# Patient Record
Sex: MALE | Race: WHITE | NOT HISPANIC OR LATINO | Employment: OTHER | URBAN - METROPOLITAN AREA
[De-identification: names, ages, dates, MRNs, and addresses within clinical notes are randomized per-mention and may not be internally consistent; named-entity substitution may affect disease eponyms.]

---

## 2018-12-08 ENCOUNTER — OFFICE VISIT (OUTPATIENT)
Dept: URGENT CARE | Facility: CLINIC | Age: 63
End: 2018-12-08
Payer: COMMERCIAL

## 2018-12-08 VITALS
RESPIRATION RATE: 12 BRPM | HEIGHT: 72 IN | SYSTOLIC BLOOD PRESSURE: 142 MMHG | OXYGEN SATURATION: 96 % | WEIGHT: 208.63 LBS | BODY MASS INDEX: 28.26 KG/M2 | DIASTOLIC BLOOD PRESSURE: 86 MMHG | TEMPERATURE: 98 F | HEART RATE: 80 BPM

## 2018-12-08 DIAGNOSIS — J32.9 SINUSITIS, UNSPECIFIED CHRONICITY, UNSPECIFIED LOCATION: Primary | ICD-10-CM

## 2018-12-08 DIAGNOSIS — H66.002 LEFT ACUTE SUPPURATIVE OTITIS MEDIA: ICD-10-CM

## 2018-12-08 PROCEDURE — 96372 THER/PROPH/DIAG INJ SC/IM: CPT | Mod: S$GLB,,, | Performed by: NURSE PRACTITIONER

## 2018-12-08 PROCEDURE — 3008F BODY MASS INDEX DOCD: CPT | Mod: CPTII,S$GLB,, | Performed by: NURSE PRACTITIONER

## 2018-12-08 PROCEDURE — 99204 OFFICE O/P NEW MOD 45 MIN: CPT | Mod: 25,S$GLB,, | Performed by: NURSE PRACTITIONER

## 2018-12-08 RX ORDER — AZITHROMYCIN 250 MG/1
TABLET, FILM COATED ORAL
Qty: 6 TABLET | Refills: 0 | Status: SHIPPED | OUTPATIENT
Start: 2018-12-08 | End: 2022-05-10 | Stop reason: ALTCHOICE

## 2018-12-08 RX ORDER — DEXAMETHASONE SODIUM PHOSPHATE 4 MG/ML
8 INJECTION, SOLUTION INTRA-ARTICULAR; INTRALESIONAL; INTRAMUSCULAR; INTRAVENOUS; SOFT TISSUE
Status: COMPLETED | OUTPATIENT
Start: 2018-12-08 | End: 2018-12-08

## 2018-12-08 RX ADMIN — DEXAMETHASONE SODIUM PHOSPHATE 8 MG: 4 INJECTION, SOLUTION INTRA-ARTICULAR; INTRALESIONAL; INTRAMUSCULAR; INTRAVENOUS; SOFT TISSUE at 09:12

## 2018-12-08 NOTE — PATIENT INSTRUCTIONS
Middle Ear Infection (Adult)  You have an infection of the middle ear, the space behind the eardrum. This is also called acute otitis media (AOM). Sometimes it is caused by the common cold. This is because congestion can block the internal passage (eustachian tube) that drains fluid from the middle ear. When the middle ear fills with fluid, bacteria can grow there and cause an infection. Oral antibiotics are used to treat this illness, not ear drops. Symptoms usually start to improve within 1 to 2 days of treatment.    Home care  The following are general care guidelines:  · Finish all of the antibiotic medicine given, even though you may feel better after the first few days.  · You may use over-the-counter medicine, such as acetaminophen or ibuprofen, to control pain and fever, unless something else was prescribed. If you have chronic liver or kidney disease or have ever had a stomach ulcer or gastrointestinal bleeding, talk with your healthcare provider before using these medicines. Do not give aspirin to anyone under 18 years of age who has a fever. It may cause severe illness or death.  Follow-up care  Follow up with your healthcare provider, or as advised, in 2 weeks if all symptoms have not gotten better, or if hearing doesn't go back to normal within 1 month.  When to seek medical advice  Call your healthcare provider right away if any of these occur:  · Ear pain gets worse or does not improve after 3 days of treatment  · Unusual drowsiness or confusion  · Neck pain, stiff neck, or headache  · Fluid or blood draining from the ear canal  · Fever of 100.4°F (38°C) or as advised   · Seizure  Date Last Reviewed: 6/1/2016  © 8886-5631 [employer]. [address] [place] [address], [place] [address]. All rights reserved. This information is not intended as a substitute for professional medical care. Always follow your healthcare professional's instructions.        Sinusitis (Antibiotic Treatment)    The  sinuses are air-filled spaces within the bones of the face. They connect to the inside of the nose. Sinusitis is an inflammation of the tissue lining the sinus cavity. Sinus inflammation can occur during a cold. It can also be due to allergies to pollens and other particles in the air. Sinusitis can cause symptoms of sinus congestion and fullness. A sinus infection causes fever, headache and facial pain. There is often green or yellow drainage from the nose or into the back of the throat (post-nasal drip). You have been given antibiotics to treat this condition.  Home care:  · Take the full course of antibiotics as instructed. Do not stop taking them, even if you feel better.  · Drink plenty of water, hot tea, and other liquids. This may help thin mucus. It also may promote sinus drainage.  · Heat may help soothe painful areas of the face. Use a towel soaked in hot water. Or,  the shower and direct the hot spray onto your face. Using a vaporizer along with a menthol rub at night may also help.   · An expectorant containing guaifenesin may help thin the mucus and promote drainage from the sinuses.  · Over-the-counter decongestants may be used unless a similar medicine was prescribed. Nasal sprays work the fastest. Use one that contains phenylephrine or oxymetazoline. First blow the nose gently. Then use the spray. Do not use these medicines more often than directed on the label or symptoms may get worse. You may also use tablets containing pseudoephedrine. Avoid products that combine ingredients, because side effects may be increased. Read labels. You can also ask the pharmacist for help. (NOTE: Persons with high blood pressure should not use decongestants. They can raise blood pressure.)  · Over-the-counter antihistamines may help if allergies contributed to your sinusitis.    · Do not use nasal rinses or irrigation during an acute sinus infection, unless told to by your health care provider. Rinsing may  spread the infection to other sinuses.  · Use acetaminophen or ibuprofen to control pain, unless another pain medicine was prescribed. (If you have chronic liver or kidney disease or ever had a stomach ulcer, talk with your doctor before using these medicines. Aspirin should never be used in anyone under 18 years of age who is ill with a fever. It may cause severe liver damage.)  · Don't smoke. This can worsen symptoms.  Follow-up care  Follow up with your healthcare provider or our staff if you are not improving within the next week.  When to seek medical advice  Call your healthcare provider if any of these occur:  · Facial pain or headache becoming more severe  · Stiff neck  · Unusual drowsiness or confusion  · Swelling of the forehead or eyelids  · Vision problems, including blurred or double vision  · Fever of 100.4ºF (38ºC) or higher, or as directed by your healthcare provider  · Seizure  · Breathing problems  · Symptoms not resolving within 10 days  Date Last Reviewed: 4/13/2015  © 4811-4741 [employer]. [address] [place] [address], [place] [address]. All rights reserved. This information is not intended as a substitute for professional medical care. Always follow your healthcare professional's instructions.

## 2018-12-08 NOTE — PROGRESS NOTES
Subjective:       Patient ID: Prosper Hua is a 63 y.o. male.    Vitals:  height is 6' (1.829 m) and weight is 94.6 kg (208 lb 9.6 oz). His oral temperature is 98.2 °F (36.8 °C). His blood pressure is 142/86 (abnormal) and his pulse is 80. His respiration is 12 and oxygen saturation is 96%.     Chief Complaint: Sinusitis    Pt presents with nasal congestion and left ear pain x 1 week. Pt denies f/c/n/v.       Sinusitis   This is a new problem. The current episode started 1 to 4 weeks ago. The problem is unchanged. His pain is at a severity of 6/10. Associated symptoms include congestion, ear pain, headaches and sinus pressure. Pertinent negatives include no chills, coughing, diaphoresis, shortness of breath or sore throat. Treatments tried: sudafed. The treatment provided mild relief.       Constitution: Negative for chills, sweating, fatigue and fever.   HENT: Positive for ear pain, congestion and sinus pressure. Negative for sinus pain, sore throat and voice change.    Neck: Negative for painful lymph nodes.   Eyes: Negative for eye redness.   Respiratory: Negative for chest tightness, cough, sputum production, bloody sputum, COPD, shortness of breath, stridor, wheezing and asthma.    Gastrointestinal: Negative for nausea and vomiting.   Musculoskeletal: Negative for muscle ache.   Skin: Negative for rash.   Allergic/Immunologic: Negative for seasonal allergies and asthma.   Neurological: Positive for headaches.   Hematologic/Lymphatic: Negative for swollen lymph nodes.       Objective:      Physical Exam   Constitutional: He is oriented to person, place, and time. He appears well-developed and well-nourished. He is cooperative.  Non-toxic appearance. He does not appear ill. No distress.   HENT:   Head: Normocephalic and atraumatic.   Right Ear: Hearing, tympanic membrane, external ear and ear canal normal.   Left Ear: Hearing, external ear and ear canal normal.   Nose: Mucosal edema present. No rhinorrhea or  nasal deformity. No epistaxis. Right sinus exhibits maxillary sinus tenderness. Right sinus exhibits no frontal sinus tenderness. Left sinus exhibits maxillary sinus tenderness. Left sinus exhibits no frontal sinus tenderness.   Mouth/Throat: Uvula is midline, oropharynx is clear and moist and mucous membranes are normal. No trismus in the jaw. Normal dentition. No uvula swelling. No posterior oropharyngeal erythema.   Left ear with purulent effusion   Eyes: Conjunctivae and lids are normal. No scleral icterus.   Sclera clear bilat   Neck: Trachea normal, full passive range of motion without pain and phonation normal. Neck supple.   Cardiovascular: Normal rate, regular rhythm, normal heart sounds, intact distal pulses and normal pulses.   Pulmonary/Chest: Effort normal and breath sounds normal. No respiratory distress.   Abdominal: Soft. Normal appearance and bowel sounds are normal. He exhibits no distension. There is no tenderness.   Musculoskeletal: Normal range of motion. He exhibits no edema or deformity.   Neurological: He is alert and oriented to person, place, and time. He exhibits normal muscle tone. Coordination normal.   Skin: Skin is warm, dry and intact. He is not diaphoretic. No pallor.   Psychiatric: He has a normal mood and affect. His speech is normal and behavior is normal. Judgment and thought content normal. Cognition and memory are normal.   Nursing note and vitals reviewed.      Assessment:       1. Sinusitis, unspecified chronicity, unspecified location    2. Left acute suppurative otitis media        Plan:         Sinusitis, unspecified chronicity, unspecified location    Left acute suppurative otitis media    Other orders  -     dexamethasone injection 8 mg  -     azithromycin (Z-REJI) 250 MG tablet; Take 2 tablets by mouth on day 1; Take 1 tablet by mouth on days 2-5  Dispense: 6 tablet; Refill: 0

## 2018-12-12 RX ORDER — CEFDINIR 300 MG/1
300 CAPSULE ORAL 2 TIMES DAILY
Qty: 20 CAPSULE | Refills: 0 | Status: SHIPPED | OUTPATIENT
Start: 2018-12-12 | End: 2018-12-22

## 2018-12-12 NOTE — PROGRESS NOTES
Pt called stating ear pain still present and only slightly improved. Pt denies f/c/n/v. Pt advised to stop zithromax and I will send in new rx for omnicef 300mg po bid x 10 days. Pt advised to f/u with PCP in the next several days for recheck or RTC if ear pain not improving. Pt verbalized understanding.

## 2021-03-06 ENCOUNTER — IMMUNIZATION (OUTPATIENT)
Dept: PRIMARY CARE CLINIC | Facility: CLINIC | Age: 66
End: 2021-03-06
Payer: COMMERCIAL

## 2021-03-06 DIAGNOSIS — Z23 NEED FOR VACCINATION: Primary | ICD-10-CM

## 2021-03-06 PROCEDURE — 91303 COVID-19,VECTOR-NR,RS-AD26,PF,0.5 ML DOSE VACCINE (JANSSEN): CPT | Mod: S$GLB,,, | Performed by: FAMILY MEDICINE

## 2021-03-06 PROCEDURE — 91303 COVID-19,VECTOR-NR,RS-AD26,PF,0.5 ML DOSE VACCINE (JANSSEN): ICD-10-PCS | Mod: S$GLB,,, | Performed by: FAMILY MEDICINE

## 2021-03-06 PROCEDURE — 0031A COVID-19,VECTOR-NR,RS-AD26,PF,0.5 ML DOSE VACCINE (JANSSEN): CPT | Mod: CV19,S$GLB,, | Performed by: FAMILY MEDICINE

## 2021-03-06 PROCEDURE — 0031A COVID-19,VECTOR-NR,RS-AD26,PF,0.5 ML DOSE VACCINE (JANSSEN): ICD-10-PCS | Mod: CV19,S$GLB,, | Performed by: FAMILY MEDICINE

## 2022-05-10 ENCOUNTER — OFFICE VISIT (OUTPATIENT)
Dept: DERMATOLOGY | Facility: CLINIC | Age: 67
End: 2022-05-10
Payer: COMMERCIAL

## 2022-05-10 VITALS — HEIGHT: 72 IN | WEIGHT: 208 LBS | BODY MASS INDEX: 28.17 KG/M2

## 2022-05-10 DIAGNOSIS — L82.1 SEBORRHEIC KERATOSES: ICD-10-CM

## 2022-05-10 DIAGNOSIS — D22.9 MULTIPLE BENIGN NEVI: ICD-10-CM

## 2022-05-10 DIAGNOSIS — L81.4 SOLAR LENTIGO: ICD-10-CM

## 2022-05-10 DIAGNOSIS — L57.0 ACTINIC KERATOSES: Primary | ICD-10-CM

## 2022-05-10 PROCEDURE — 99203 OFFICE O/P NEW LOW 30 MIN: CPT | Mod: 25,S$GLB,, | Performed by: DERMATOLOGY

## 2022-05-10 PROCEDURE — 3008F BODY MASS INDEX DOCD: CPT | Mod: CPTII,S$GLB,, | Performed by: DERMATOLOGY

## 2022-05-10 PROCEDURE — 4010F PR ACE/ARB THEARPY RXD/TAKEN: ICD-10-PCS | Mod: CPTII,S$GLB,, | Performed by: DERMATOLOGY

## 2022-05-10 PROCEDURE — 17000 DESTRUCT PREMALG LESION: CPT | Mod: S$GLB,,, | Performed by: DERMATOLOGY

## 2022-05-10 PROCEDURE — 99999 PR PBB SHADOW E&M-EST. PATIENT-LVL III: ICD-10-PCS | Mod: PBBFAC,,, | Performed by: DERMATOLOGY

## 2022-05-10 PROCEDURE — 1159F PR MEDICATION LIST DOCUMENTED IN MEDICAL RECORD: ICD-10-PCS | Mod: CPTII,S$GLB,, | Performed by: DERMATOLOGY

## 2022-05-10 PROCEDURE — 1160F PR REVIEW ALL MEDS BY PRESCRIBER/CLIN PHARMACIST DOCUMENTED: ICD-10-PCS | Mod: CPTII,S$GLB,, | Performed by: DERMATOLOGY

## 2022-05-10 PROCEDURE — 17000 PR DESTRUCTION(LASER SURGERY,CRYOSURGERY,CHEMOSURGERY),PREMALIGNANT LESIONS,FIRST LESION: ICD-10-PCS | Mod: S$GLB,,, | Performed by: DERMATOLOGY

## 2022-05-10 PROCEDURE — 3008F PR BODY MASS INDEX (BMI) DOCUMENTED: ICD-10-PCS | Mod: CPTII,S$GLB,, | Performed by: DERMATOLOGY

## 2022-05-10 PROCEDURE — 1159F MED LIST DOCD IN RCRD: CPT | Mod: CPTII,S$GLB,, | Performed by: DERMATOLOGY

## 2022-05-10 PROCEDURE — 1160F RVW MEDS BY RX/DR IN RCRD: CPT | Mod: CPTII,S$GLB,, | Performed by: DERMATOLOGY

## 2022-05-10 PROCEDURE — 4010F ACE/ARB THERAPY RXD/TAKEN: CPT | Mod: CPTII,S$GLB,, | Performed by: DERMATOLOGY

## 2022-05-10 PROCEDURE — 99203 PR OFFICE/OUTPT VISIT, NEW, LEVL III, 30-44 MIN: ICD-10-PCS | Mod: 25,S$GLB,, | Performed by: DERMATOLOGY

## 2022-05-10 PROCEDURE — 99999 PR PBB SHADOW E&M-EST. PATIENT-LVL III: CPT | Mod: PBBFAC,,, | Performed by: DERMATOLOGY

## 2022-05-10 RX ORDER — NAPROXEN SODIUM 220 MG/1
81 TABLET, FILM COATED ORAL EVERY OTHER DAY
COMMUNITY
End: 2024-01-30

## 2022-05-10 NOTE — PROGRESS NOTES
Subjective:       Patient ID:  Prosper Hua is a 66 y.o. male who presents for   Chief Complaint   Patient presents with    Spot     On left side of nose     LOV 3/3/17-Dr. Amanda guaman    Here today to have a spot on the left side of his nose looked at   Been there for years with no symptoms    Has no hx of NMSC  Has no fhx of MM      Current Outpatient Medications:     aspirin 325 MG tablet, Take 325 mg by mouth every other day., Disp: , Rfl:     atorvastatin (LIPITOR) 40 MG tablet, Take 40 mg by mouth once daily., Disp: , Rfl: 3    clopidogreL (PLAVIX) 75 mg tablet, Take 75 mg by mouth once daily., Disp: , Rfl:     lisinopril 10 MG tablet, Take 10 mg by mouth once daily., Disp: , Rfl:     azithromycin (Z-REJI) 250 MG tablet, Take 2 tablets by mouth on day 1; Take 1 tablet by mouth on days 2-5 (Patient not taking: Reported on 5/10/2022), Disp: 6 tablet, Rfl: 0    metoprolol succinate (TOPROL-XL) 100 MG 24 hr tablet, Take 100 mg by mouth once daily., Disp: , Rfl: 3    ranitidine (ZANTAC) 300 MG tablet, Take 1 tablet (300 mg total) by mouth 2 (two) times daily., Disp: 60 tablet, Rfl: 11        Review of Systems   Constitutional: Negative for fever, chills and fatigue.   Respiratory: Negative for cough and shortness of breath.    Skin: Positive for activity-related sunscreen use. Negative for itching, rash, dry skin, daily sunscreen use and wears hat.   Hematologic/Lymphatic: Bruises/bleeds easily.        Objective:    Physical Exam   Constitutional: He appears well-developed and well-nourished. No distress.   Neurological: He is alert and oriented to person, place, and time. He is not disoriented.   Psychiatric: He has a normal mood and affect.   Skin:   Areas Examined (abnormalities noted in diagram):   Head / Face Inspection Performed  Neck Inspection Performed  Chest / Axilla Inspection Performed  Back Inspection Performed  RUE Inspected  LUE Inspection Performed                       Diagram Legend      Erythematous scaling macule/papule c/w actinic keratosis       Vascular papule c/w angioma      Pigmented verrucoid papule/plaque c/w seborrheic keratosis      Yellow umbilicated papule c/w sebaceous hyperplasia      Irregularly shaped tan macule c/w lentigo     1-2 mm smooth white papules consistent with Milia      Movable subcutaneous cyst with punctum c/w epidermal inclusion cyst      Subcutaneous movable cyst c/w pilar cyst      Firm pink to brown papule c/w dermatofibroma      Pedunculated fleshy papule(s) c/w skin tag(s)      Evenly pigmented macule c/w junctional nevus     Mildly variegated pigmented, slightly irregular-bordered macule c/w mildly atypical nevus      Flesh colored to evenly pigmented papule c/w intradermal nevus       Pink pearly papule/plaque c/w basal cell carcinoma      Erythematous hyperkeratotic cursted plaque c/w SCC      Surgical scar with no sign of skin cancer recurrence      Open and closed comedones      Inflammatory papules and pustules      Verrucoid papule consistent consistent with wart     Erythematous eczematous patches and plaques     Dystrophic onycholytic nail with subungual debris c/w onychomycosis     Umbilicated papule    Erythematous-base heme-crusted tan verrucoid plaque consistent with inflamed seborrheic keratosis     Erythematous Silvery Scaling Plaque c/w Psoriasis     See annotation      Assessment / Plan:        Actinic keratoses  Cryosurgery Procedure Note    Verbal consent from the patient is obtained and the patient is aware of the precancerous quality and need for treatment of these lesions. Liquid nitrogen cryosurgery is applied to the 1 actinic keratoses, as detailed in the physical exam, to produce a freeze injury. The patient is aware that blisters may form and is instructed on wound care with gentle cleansing and use of vaseline ointment to keep moist until healed. The patient is supplied a handout on cryosurgery and is instructed to call if lesions do  not completely resolve.    Seborrheic keratoses, including large lesions on left nasofacial sulcus and R back  These are benign inherited growths without a malignant potential. Reassurance given to patient. No treatment is necessary.     Solar lentigo  This is a benign hyperpigmented sun induced lesion. Daily sun protection will reduce the number of new lesions. Treatment of these benign lesions are considered cosmetic.    Multiple benign nevi  Careful dermoscopy evaluation of nevi performed with none identified as needing biopsy today  Monitor for new mole or moles that are becoming bigger, darker, irritated, or developing irregular borders.     Patient instructed in importance in daily broad spectrum sun protection of at least spf 30. Mineral sunscreen ingredients preferred (Zinc +/- Titanium) and can be found OTC.   Recommend Elta MD for daily use on face and neck.  Patient encouraged to wear hat for all outdoor exposure.   Also discussed sun avoidance and use of protective clothing.               Follow up in about 1 year (around 5/10/2023), or if symptoms worsen or fail to improve.

## 2022-05-10 NOTE — PATIENT INSTRUCTIONS

## 2024-01-30 ENCOUNTER — OFFICE VISIT (OUTPATIENT)
Dept: FAMILY MEDICINE | Facility: CLINIC | Age: 69
End: 2024-01-30
Payer: COMMERCIAL

## 2024-01-30 DIAGNOSIS — L25.9 CONTACT DERMATITIS, UNSPECIFIED CONTACT DERMATITIS TYPE, UNSPECIFIED TRIGGER: Primary | ICD-10-CM

## 2024-01-30 PROCEDURE — 99203 OFFICE O/P NEW LOW 30 MIN: CPT | Mod: 95,,, | Performed by: PHYSICIAN ASSISTANT

## 2024-01-30 PROCEDURE — 1159F MED LIST DOCD IN RCRD: CPT | Mod: CPTII,95,, | Performed by: PHYSICIAN ASSISTANT

## 2024-01-30 PROCEDURE — 1160F RVW MEDS BY RX/DR IN RCRD: CPT | Mod: CPTII,95,, | Performed by: PHYSICIAN ASSISTANT

## 2024-01-30 RX ORDER — ALPRAZOLAM 0.5 MG/1
0.5 TABLET ORAL NIGHTLY PRN
COMMUNITY
Start: 2023-12-26

## 2024-01-30 RX ORDER — PREDNISONE 10 MG/1
TABLET ORAL
Qty: 12 TABLET | Refills: 0 | Status: SHIPPED | OUTPATIENT
Start: 2024-01-30

## 2024-01-30 RX ORDER — ATORVASTATIN CALCIUM 80 MG/1
80 TABLET, FILM COATED ORAL NIGHTLY
COMMUNITY
Start: 2023-12-26

## 2024-01-30 RX ORDER — HYDROCORTISONE 25 MG/G
CREAM TOPICAL 2 TIMES DAILY
Qty: 28 G | Refills: 0 | Status: SHIPPED | OUTPATIENT
Start: 2024-01-30

## 2024-01-30 NOTE — PROGRESS NOTES
The patient location is: [place]  The chief complaint leading to consultation is: rash    Visit type: audiovisual    Face to Face time with patient: 15 minutes of total time spent on the encounter, which includes face to face time and non-face to face time preparing to see the patient (eg, review of tests), Obtaining and/or reviewing separately obtained history, Documenting clinical information in the electronic or other health record, Independently interpreting results (not separately reported) and communicating results to the patient/family/caregiver, or Care coordination (not separately reported).         Each patient to whom he or she provides medical services by telemedicine is:  (1) informed of the relationship between the physician and patient and the respective role of any other health care provider with respect to management of the patient; and (2) notified that he or she may decline to receive medical services by telemedicine and may withdraw from such care at any time.    Notes:  Patient states the symptoms started about 2 weeks ago.  He complains of a red raised bumpy itchy rash on the forehead and on his cheeks.  He has been using Neosporin with no relief.  He denies any new medications and denies any new soap, lotion, detergent or shampoo.  He denies any sign of infection or pain in the skin.  He denies any throat swelling or eye drainage.    Prosper was seen today for rash.    Diagnoses and all orders for this visit:    Contact dermatitis, unspecified contact dermatitis type, unspecified trigger  -     predniSONE (DELTASONE) 10 MG tablet; 3 pills a day for 2 days, then 2 pills a day for 2 days then 1 pill a day  -     hydrocortisone 2.5 % cream; Apply topically 2 (two) times daily.     If symptoms do not improve he needs to be seen in clinic.  He also needs to schedule his routine medical checkup and states he will call back to do so  Answers submitted by the patient for this visit:  Rash  Questionnaire (Submitted on 1/29/2024)  Chief Complaint: Rash  Chronicity: new  Onset: in the past 7 days  Progression since onset: gradually worsening  Affected locations: head, face, left ear, right ear  Characteristics: blistering, burning, redness, itchiness  Exposed to: unknown  anorexia: No  congestion: Yes  cough: Yes  diarrhea: Yes  eye pain: No  facial edema: Yes  fatigue: No  fever: No  joint pain: No  nail changes: No  rhinorrhea: No  shortness of breath: No  sore throat: No  vomiting: No  Treatments tried: anti-itch cream, cold compress, moisturizer  Improvement on treatment: no relief  asthma: No  allergies: No  eczema: No  varicella: Yes

## 2025-01-26 ENCOUNTER — HOSPITAL ENCOUNTER (OUTPATIENT)
Facility: HOSPITAL | Age: 70
Discharge: HOME | End: 2025-01-27
Attending: EMERGENCY MEDICINE | Admitting: INTERNAL MEDICINE
Payer: COMMERCIAL

## 2025-01-26 ENCOUNTER — OFFICE VISIT (OUTPATIENT)
Dept: URGENT CARE | Facility: CLINIC | Age: 70
End: 2025-01-26
Payer: COMMERCIAL

## 2025-01-26 VITALS
TEMPERATURE: 98 F | SYSTOLIC BLOOD PRESSURE: 152 MMHG | DIASTOLIC BLOOD PRESSURE: 87 MMHG | RESPIRATION RATE: 19 BRPM | WEIGHT: 170 LBS | OXYGEN SATURATION: 91 % | HEART RATE: 78 BPM | BODY MASS INDEX: 23.03 KG/M2 | HEIGHT: 72 IN

## 2025-01-26 DIAGNOSIS — R09.02 HYPOXEMIA: ICD-10-CM

## 2025-01-26 DIAGNOSIS — J44.1 COPD EXACERBATION: ICD-10-CM

## 2025-01-26 DIAGNOSIS — J18.9 PNEUMONIA OF LEFT UPPER LOBE DUE TO INFECTIOUS ORGANISM: Primary | ICD-10-CM

## 2025-01-26 DIAGNOSIS — R06.02 SHORTNESS OF BREATH: ICD-10-CM

## 2025-01-26 DIAGNOSIS — R05.9 COUGH, UNSPECIFIED TYPE: ICD-10-CM

## 2025-01-26 DIAGNOSIS — J96.01 ACUTE HYPOXEMIC RESPIRATORY FAILURE: ICD-10-CM

## 2025-01-26 DIAGNOSIS — F17.200 SMOKER: ICD-10-CM

## 2025-01-26 DIAGNOSIS — J18.9 PNEUMONIA OF LEFT LOWER LOBE DUE TO INFECTIOUS ORGANISM: Primary | ICD-10-CM

## 2025-01-26 DIAGNOSIS — J11.1 INFLUENZA: ICD-10-CM

## 2025-01-26 PROBLEM — E87.6 HYPOKALEMIA: Status: ACTIVE | Noted: 2025-01-26

## 2025-01-26 PROBLEM — E87.5 HYPERKALEMIA: Status: ACTIVE | Noted: 2025-01-26

## 2025-01-26 LAB
ALBUMIN SERPL BCP-MCNC: 3 G/DL (ref 3.5–5.2)
ALLENS TEST: ABNORMAL
ALP SERPL-CCNC: 103 U/L (ref 40–150)
ALT SERPL W/O P-5'-P-CCNC: 36 U/L (ref 10–44)
ANION GAP SERPL CALC-SCNC: 14 MMOL/L (ref 8–16)
AST SERPL-CCNC: 32 U/L (ref 10–40)
BASOPHILS # BLD AUTO: ABNORMAL K/UL (ref 0–0.2)
BASOPHILS NFR BLD: 0 % (ref 0–1.9)
BILIRUB SERPL-MCNC: 0.8 MG/DL (ref 0.1–1)
BNP SERPL-MCNC: 37 PG/ML (ref 0–99)
BUN SERPL-MCNC: 10 MG/DL (ref 8–23)
CALCIUM SERPL-MCNC: 8.7 MG/DL (ref 8.7–10.5)
CHLORIDE SERPL-SCNC: 95 MMOL/L (ref 95–110)
CHOLEST SERPL-MCNC: 103 MG/DL (ref 120–199)
CHOLEST/HDLC SERPL: 4.3 {RATIO} (ref 2–5)
CO2 SERPL-SCNC: 23 MMOL/L (ref 23–29)
CREAT SERPL-MCNC: 0.7 MG/DL (ref 0.5–1.4)
CTP QC/QA: YES
CTP QC/QA: YES
DELSYS: ABNORMAL
DIFFERENTIAL METHOD BLD: ABNORMAL
EOSINOPHIL # BLD AUTO: ABNORMAL K/UL (ref 0–0.5)
EOSINOPHIL NFR BLD: 0 % (ref 0–8)
ERYTHROCYTE [DISTWIDTH] IN BLOOD BY AUTOMATED COUNT: 12.3 % (ref 11.5–14.5)
EST. GFR  (NO RACE VARIABLE): >60 ML/MIN/1.73 M^2
ESTIMATED AVG GLUCOSE: 117 MG/DL (ref 68–131)
FLOW: 2
FLUAV AG NPH QL: NEGATIVE
FLUBV AG NPH QL: NEGATIVE
GLUCOSE SERPL-MCNC: 111 MG/DL (ref 70–110)
HBA1C MFR BLD: 5.7 % (ref 4.5–6.2)
HCO3 UR-SCNC: 25.9 MMOL/L (ref 24–28)
HCT VFR BLD AUTO: 44.9 % (ref 40–54)
HDLC SERPL-MCNC: 24 MG/DL (ref 40–75)
HDLC SERPL: 23.3 % (ref 20–50)
HGB BLD-MCNC: 15.7 G/DL (ref 14–18)
IMM GRANULOCYTES # BLD AUTO: ABNORMAL K/UL (ref 0–0.04)
IMM GRANULOCYTES NFR BLD AUTO: ABNORMAL % (ref 0–0.5)
LACTATE SERPL-SCNC: 1.6 MMOL/L (ref 0.5–2.2)
LDLC SERPL CALC-MCNC: 53.8 MG/DL (ref 63–159)
LYMPHOCYTES # BLD AUTO: ABNORMAL K/UL (ref 1–4.8)
LYMPHOCYTES NFR BLD: 10 % (ref 18–48)
MAGNESIUM SERPL-MCNC: 1.8 MG/DL (ref 1.6–2.6)
MCH RBC QN AUTO: 32 PG (ref 27–31)
MCHC RBC AUTO-ENTMCNC: 35 G/DL (ref 32–36)
MCV RBC AUTO: 91 FL (ref 82–98)
MODE: ABNORMAL
MONOCYTES # BLD AUTO: ABNORMAL K/UL (ref 0.3–1)
MONOCYTES NFR BLD: 8 % (ref 4–15)
NEUTROPHILS NFR BLD: 80 % (ref 38–73)
NEUTS BAND NFR BLD MANUAL: 2 %
NONHDLC SERPL-MCNC: 79 MG/DL
NRBC BLD-RTO: 0 /100 WBC
OVALOCYTES BLD QL SMEAR: ABNORMAL
PCO2 BLDA: 35.3 MMHG (ref 35–45)
PH SMN: 7.47 [PH] (ref 7.35–7.45)
PLATELET # BLD AUTO: 223 K/UL (ref 150–450)
PLATELET BLD QL SMEAR: ABNORMAL
PMV BLD AUTO: 9.4 FL (ref 9.2–12.9)
PO2 BLDA: 64 MMHG (ref 80–100)
POC BE: 2 MMOL/L
POC SATURATED O2: 94 % (ref 95–100)
POC TCO2: 27 MMOL/L (ref 23–27)
POIKILOCYTOSIS BLD QL SMEAR: SLIGHT
POTASSIUM SERPL-SCNC: 3 MMOL/L (ref 3.5–5.1)
PROCALCITONIN SERPL IA-MCNC: 0.06 NG/ML (ref 0–0.5)
PROT SERPL-MCNC: 7.5 G/DL (ref 6–8.4)
RBC # BLD AUTO: 4.91 M/UL (ref 4.6–6.2)
SAMPLE: ABNORMAL
SARS-COV-2 AG RESP QL IA.RAPID: NEGATIVE
SITE: ABNORMAL
SODIUM SERPL-SCNC: 132 MMOL/L (ref 136–145)
SP02: 93
T4 FREE SERPL-MCNC: 1.3 NG/DL (ref 0.71–1.51)
TRIGL SERPL-MCNC: 126 MG/DL (ref 30–150)
TROPONIN I SERPL DL<=0.01 NG/ML-MCNC: <0.006 NG/ML (ref 0–0.03)
TSH SERPL DL<=0.005 MIU/L-ACNC: 0.33 UIU/ML (ref 0.4–4)
WBC # BLD AUTO: 12.9 K/UL (ref 3.9–12.7)

## 2025-01-26 PROCEDURE — 99406 BEHAV CHNG SMOKING 3-10 MIN: CPT

## 2025-01-26 PROCEDURE — 83036 HEMOGLOBIN GLYCOSYLATED A1C: CPT | Performed by: NURSE PRACTITIONER

## 2025-01-26 PROCEDURE — 99900031 HC PATIENT EDUCATION (STAT)

## 2025-01-26 PROCEDURE — 36415 COLL VENOUS BLD VENIPUNCTURE: CPT | Performed by: NURSE PRACTITIONER

## 2025-01-26 PROCEDURE — 27000221 HC OXYGEN, UP TO 24 HOURS

## 2025-01-26 PROCEDURE — 80061 LIPID PANEL: CPT | Performed by: NURSE PRACTITIONER

## 2025-01-26 PROCEDURE — 99285 EMERGENCY DEPT VISIT HI MDM: CPT | Mod: 25

## 2025-01-26 PROCEDURE — 99204 OFFICE O/P NEW MOD 45 MIN: CPT | Mod: S$GLB,,,

## 2025-01-26 PROCEDURE — 94640 AIRWAY INHALATION TREATMENT: CPT | Mod: XB

## 2025-01-26 PROCEDURE — 94761 N-INVAS EAR/PLS OXIMETRY MLT: CPT

## 2025-01-26 PROCEDURE — 84443 ASSAY THYROID STIM HORMONE: CPT | Performed by: NURSE PRACTITIONER

## 2025-01-26 PROCEDURE — 84439 ASSAY OF FREE THYROXINE: CPT | Performed by: NURSE PRACTITIONER

## 2025-01-26 PROCEDURE — G0378 HOSPITAL OBSERVATION PER HR: HCPCS

## 2025-01-26 PROCEDURE — 99900035 HC TECH TIME PER 15 MIN (STAT)

## 2025-01-26 PROCEDURE — 84484 ASSAY OF TROPONIN QUANT: CPT | Performed by: EMERGENCY MEDICINE

## 2025-01-26 PROCEDURE — 94799 UNLISTED PULMONARY SVC/PX: CPT

## 2025-01-26 PROCEDURE — 96374 THER/PROPH/DIAG INJ IV PUSH: CPT

## 2025-01-26 PROCEDURE — 83735 ASSAY OF MAGNESIUM: CPT | Performed by: NURSE PRACTITIONER

## 2025-01-26 PROCEDURE — 82803 BLOOD GASES ANY COMBINATION: CPT

## 2025-01-26 PROCEDURE — 25000242 PHARM REV CODE 250 ALT 637 W/ HCPCS: Performed by: NURSE PRACTITIONER

## 2025-01-26 PROCEDURE — 80053 COMPREHEN METABOLIC PANEL: CPT | Performed by: EMERGENCY MEDICINE

## 2025-01-26 PROCEDURE — 96375 TX/PRO/DX INJ NEW DRUG ADDON: CPT

## 2025-01-26 PROCEDURE — 36600 WITHDRAWAL OF ARTERIAL BLOOD: CPT

## 2025-01-26 PROCEDURE — 71046 X-RAY EXAM CHEST 2 VIEWS: CPT | Mod: S$GLB,,, | Performed by: RADIOLOGY

## 2025-01-26 PROCEDURE — 25000003 PHARM REV CODE 250: Performed by: EMERGENCY MEDICINE

## 2025-01-26 PROCEDURE — 94640 AIRWAY INHALATION TREATMENT: CPT

## 2025-01-26 PROCEDURE — 87040 BLOOD CULTURE FOR BACTERIA: CPT | Performed by: EMERGENCY MEDICINE

## 2025-01-26 PROCEDURE — 85027 COMPLETE CBC AUTOMATED: CPT | Performed by: EMERGENCY MEDICINE

## 2025-01-26 PROCEDURE — 63600175 PHARM REV CODE 636 W HCPCS: Mod: JZ,TB | Performed by: NURSE PRACTITIONER

## 2025-01-26 PROCEDURE — 87804 INFLUENZA ASSAY W/OPTIC: CPT | Mod: QW,,,

## 2025-01-26 PROCEDURE — 94760 N-INVAS EAR/PLS OXIMETRY 1: CPT | Mod: XB

## 2025-01-26 PROCEDURE — 84145 PROCALCITONIN (PCT): CPT | Performed by: EMERGENCY MEDICINE

## 2025-01-26 PROCEDURE — 83605 ASSAY OF LACTIC ACID: CPT | Performed by: EMERGENCY MEDICINE

## 2025-01-26 PROCEDURE — 63700000 PHARM REV CODE 250 ALT 637 W/O HCPCS: Performed by: EMERGENCY MEDICINE

## 2025-01-26 PROCEDURE — 25000003 PHARM REV CODE 250: Performed by: NURSE PRACTITIONER

## 2025-01-26 PROCEDURE — 63600175 PHARM REV CODE 636 W HCPCS: Performed by: EMERGENCY MEDICINE

## 2025-01-26 PROCEDURE — 87811 SARS-COV-2 COVID19 W/OPTIC: CPT | Mod: QW,S$GLB,,

## 2025-01-26 PROCEDURE — 25000242 PHARM REV CODE 250 ALT 637 W/ HCPCS: Performed by: EMERGENCY MEDICINE

## 2025-01-26 PROCEDURE — 83880 ASSAY OF NATRIURETIC PEPTIDE: CPT | Performed by: EMERGENCY MEDICINE

## 2025-01-26 PROCEDURE — 36415 COLL VENOUS BLD VENIPUNCTURE: CPT | Performed by: EMERGENCY MEDICINE

## 2025-01-26 PROCEDURE — 85007 BL SMEAR W/DIFF WBC COUNT: CPT | Performed by: EMERGENCY MEDICINE

## 2025-01-26 RX ORDER — TALC
6 POWDER (GRAM) TOPICAL NIGHTLY PRN
Status: DISCONTINUED | OUTPATIENT
Start: 2025-01-26 | End: 2025-01-27 | Stop reason: HOSPADM

## 2025-01-26 RX ORDER — FAMOTIDINE 20 MG/1
20 TABLET, FILM COATED ORAL 2 TIMES DAILY
Status: DISCONTINUED | OUTPATIENT
Start: 2025-01-26 | End: 2025-01-27 | Stop reason: HOSPADM

## 2025-01-26 RX ORDER — SODIUM,POTASSIUM PHOSPHATES 280-250MG
2 POWDER IN PACKET (EA) ORAL
Status: DISCONTINUED | OUTPATIENT
Start: 2025-01-26 | End: 2025-01-27 | Stop reason: HOSPADM

## 2025-01-26 RX ORDER — AZITHROMYCIN 250 MG/1
250 TABLET, FILM COATED ORAL DAILY
Status: DISCONTINUED | OUTPATIENT
Start: 2025-01-27 | End: 2025-01-27 | Stop reason: HOSPADM

## 2025-01-26 RX ORDER — POTASSIUM CHLORIDE 20 MEQ/1
40 TABLET, EXTENDED RELEASE ORAL
Status: COMPLETED | OUTPATIENT
Start: 2025-01-26 | End: 2025-01-26

## 2025-01-26 RX ORDER — CEFTRIAXONE 2 G/1
2 INJECTION, POWDER, FOR SOLUTION INTRAMUSCULAR; INTRAVENOUS
Status: COMPLETED | OUTPATIENT
Start: 2025-01-26 | End: 2025-01-26

## 2025-01-26 RX ORDER — METOPROLOL SUCCINATE 50 MG/1
50 TABLET, EXTENDED RELEASE ORAL DAILY
Status: DISCONTINUED | OUTPATIENT
Start: 2025-01-27 | End: 2025-01-27 | Stop reason: HOSPADM

## 2025-01-26 RX ORDER — SODIUM CHLORIDE 0.9 % (FLUSH) 0.9 %
10 SYRINGE (ML) INJECTION
Status: DISCONTINUED | OUTPATIENT
Start: 2025-01-26 | End: 2025-01-27 | Stop reason: HOSPADM

## 2025-01-26 RX ORDER — SODIUM CHLORIDE 0.9 % (FLUSH) 0.9 %
3 SYRINGE (ML) INJECTION
Status: DISCONTINUED | OUTPATIENT
Start: 2025-01-26 | End: 2025-01-27 | Stop reason: HOSPADM

## 2025-01-26 RX ORDER — CEFTRIAXONE 2 G/1
2 INJECTION, POWDER, FOR SOLUTION INTRAMUSCULAR; INTRAVENOUS
Status: DISCONTINUED | OUTPATIENT
Start: 2025-01-27 | End: 2025-01-27

## 2025-01-26 RX ORDER — LANOLIN ALCOHOL/MO/W.PET/CERES
800 CREAM (GRAM) TOPICAL
Status: DISCONTINUED | OUTPATIENT
Start: 2025-01-26 | End: 2025-01-27 | Stop reason: HOSPADM

## 2025-01-26 RX ORDER — CLOPIDOGREL BISULFATE 75 MG/1
75 TABLET ORAL DAILY
Status: DISCONTINUED | OUTPATIENT
Start: 2025-01-26 | End: 2025-01-27 | Stop reason: HOSPADM

## 2025-01-26 RX ORDER — IPRATROPIUM BROMIDE AND ALBUTEROL SULFATE 2.5; .5 MG/3ML; MG/3ML
3 SOLUTION RESPIRATORY (INHALATION)
Status: COMPLETED | OUTPATIENT
Start: 2025-01-26 | End: 2025-01-26

## 2025-01-26 RX ORDER — IPRATROPIUM BROMIDE AND ALBUTEROL SULFATE 2.5; .5 MG/3ML; MG/3ML
3 SOLUTION RESPIRATORY (INHALATION) EVERY 4 HOURS
Status: DISCONTINUED | OUTPATIENT
Start: 2025-01-26 | End: 2025-01-27 | Stop reason: HOSPADM

## 2025-01-26 RX ORDER — LISINOPRIL 10 MG/1
10 TABLET ORAL DAILY
Status: DISCONTINUED | OUTPATIENT
Start: 2025-01-27 | End: 2025-01-27

## 2025-01-26 RX ORDER — ALPRAZOLAM 0.25 MG/1
0.5 TABLET ORAL 2 TIMES DAILY PRN
Status: DISCONTINUED | OUTPATIENT
Start: 2025-01-26 | End: 2025-01-27 | Stop reason: HOSPADM

## 2025-01-26 RX ORDER — OSELTAMIVIR PHOSPHATE 75 MG/1
75 CAPSULE ORAL 2 TIMES DAILY
Status: DISCONTINUED | OUTPATIENT
Start: 2025-01-26 | End: 2025-01-27 | Stop reason: HOSPADM

## 2025-01-26 RX ORDER — ATORVASTATIN CALCIUM 20 MG/1
80 TABLET, FILM COATED ORAL NIGHTLY
Status: DISCONTINUED | OUTPATIENT
Start: 2025-01-27 | End: 2025-01-27 | Stop reason: HOSPADM

## 2025-01-26 RX ORDER — IPRATROPIUM BROMIDE AND ALBUTEROL SULFATE 2.5; .5 MG/3ML; MG/3ML
3 SOLUTION RESPIRATORY (INHALATION)
Status: DISCONTINUED | OUTPATIENT
Start: 2025-01-26 | End: 2025-01-26

## 2025-01-26 RX ORDER — ENOXAPARIN SODIUM 100 MG/ML
40 INJECTION SUBCUTANEOUS EVERY 24 HOURS
Status: DISCONTINUED | OUTPATIENT
Start: 2025-01-26 | End: 2025-01-27 | Stop reason: HOSPADM

## 2025-01-26 RX ORDER — AZITHROMYCIN 250 MG/1
500 TABLET, FILM COATED ORAL
Status: COMPLETED | OUTPATIENT
Start: 2025-01-26 | End: 2025-01-26

## 2025-01-26 RX ORDER — DOXYCYCLINE HYCLATE 100 MG
100 TABLET ORAL 2 TIMES DAILY
Qty: 20 TABLET | Refills: 0 | Status: ON HOLD | OUTPATIENT
Start: 2025-01-26 | End: 2025-01-26 | Stop reason: ALTCHOICE

## 2025-01-26 RX ORDER — HYDROCODONE BITARTRATE AND ACETAMINOPHEN 5; 325 MG/1; MG/1
1 TABLET ORAL EVERY 4 HOURS PRN
Status: DISCONTINUED | OUTPATIENT
Start: 2025-01-26 | End: 2025-01-27 | Stop reason: HOSPADM

## 2025-01-26 RX ORDER — PREDNISONE 20 MG/1
40 TABLET ORAL DAILY
Status: DISCONTINUED | OUTPATIENT
Start: 2025-01-27 | End: 2025-01-27 | Stop reason: HOSPADM

## 2025-01-26 RX ADMIN — ALPRAZOLAM 0.5 MG: 0.25 TABLET ORAL at 04:01

## 2025-01-26 RX ADMIN — CEFTRIAXONE SODIUM 2 G: 2 INJECTION, POWDER, FOR SOLUTION INTRAMUSCULAR; INTRAVENOUS at 12:01

## 2025-01-26 RX ADMIN — OSELTAMIVIR PHOSPHATE 75 MG: 75 CAPSULE ORAL at 03:01

## 2025-01-26 RX ADMIN — IPRATROPIUM BROMIDE AND ALBUTEROL SULFATE 3 ML: .5; 3 SOLUTION RESPIRATORY (INHALATION) at 04:01

## 2025-01-26 RX ADMIN — IPRATROPIUM BROMIDE AND ALBUTEROL SULFATE 3 ML: .5; 3 SOLUTION RESPIRATORY (INHALATION) at 08:01

## 2025-01-26 RX ADMIN — IPRATROPIUM BROMIDE AND ALBUTEROL SULFATE 3 ML: .5; 3 SOLUTION RESPIRATORY (INHALATION) at 12:01

## 2025-01-26 RX ADMIN — POTASSIUM CHLORIDE 40 MEQ: 1500 TABLET, EXTENDED RELEASE ORAL at 01:01

## 2025-01-26 RX ADMIN — CLOPIDOGREL BISULFATE 75 MG: 75 TABLET, FILM COATED ORAL at 03:01

## 2025-01-26 RX ADMIN — FAMOTIDINE 20 MG: 20 TABLET, FILM COATED ORAL at 10:01

## 2025-01-26 RX ADMIN — METHYLPREDNISOLONE SODIUM SUCCINATE 80 MG: 40 INJECTION, POWDER, FOR SOLUTION INTRAMUSCULAR; INTRAVENOUS at 03:01

## 2025-01-26 RX ADMIN — AZITHROMYCIN DIHYDRATE 500 MG: 250 TABLET ORAL at 12:01

## 2025-01-26 NOTE — ASSESSMENT & PLAN NOTE
Patient has a diagnosis of pneumonia. The cause of the pneumonia is viral in etiology due to  unclear . The pneumonia is improving. The patient has the following signs/symptoms of pneumonia: persistent hypoxia , cough, and shortness of breath. The patient does have a current oxygen requirement and the patient does not have a home oxygen requirement. I have reviewed the pertinent imaging. The following cultures have been collected: Blood cultures The culture results are listed below.     Current antimicrobial regimen consists of the antibiotics listed below. Will monitor patient closely and continue current treatment plan unchanged.    Antibiotics (From admission, onward)      None            Microbiology Results (last 7 days)       Procedure Component Value Units Date/Time    Blood culture #1 [[id number]] Collected: 01/26/25 1212    Order Status: Sent Specimen: Blood from Peripheral, Antecubital, Right     Blood culture #2 [[id number]] Collected: 01/26/25 1212    Order Status: Sent Specimen: Blood from Peripheral, Antecubital, Left

## 2025-01-26 NOTE — PLAN OF CARE
[facility] - Med/Surg  Initial Discharge Assessment       Primary Care Provider: Ervin Batista MD    Admission Diagnosis: Pneumonia of left lower lobe due to infectious organism [J18.9]    Admission Date: 1/26/2025  Expected Discharge Date:     Transition of Care Barriers: None    Payor: BLUE [place] BLUE [facility] / Plan: [place] [employer] [facility] EPO / Product Type: Commercial /     Extended Emergency Contact Information  Primary Emergency Contact: Po Hua  Address: [address] [place] [address] [facility]  Home Phone: [phone]  Work Phone: [phone]  Mobile Phone: [phone]  Relation: Spouse    Discharge Plan A: Home with family  Discharge Plan B: Home      [id number] [place] - [address]  [address] [place] [address]  Phone: [phone] Fax: [phone]    DC assessment completed at bedside. Information verified. Lives with spouse who will drive him home. PCP was Dr. Batista (retired), pt reports he mainly only sees his cardiologist Dr. Moura but needs PCP. [facility]. Takes plavix. Denies hh/hd/dme. Independent, drives self. Insurance verified. Denies POA. Denies recent admission. Planning to DC home when med clear.    Spouse provided me pt's medicare part A card, sent this to registration to add to pt's chart.      Initial Assessment (most recent)       Adult Discharge Assessment - 01/26/25 1518          Discharge Assessment    Assessment Type Discharge Planning Assessment     Confirmed/corrected address, phone number and insurance Yes     Confirmed Demographics Correct on Facesheet     Source of Information patient;family     Does patient/caregiver understand observation status Yes     Communicated JADIEL with patient/caregiver Yes     People in Home spouse     Facility Arrived From: home     Do you expect to return to your current living situation? Yes     Do you have help at home or someone to help you manage your care at home?  Yes     Who are your caregiver(s) and their phone number(s)? spouse     Prior to hospitilization cognitive status: Alert/Oriented     Current cognitive status: Alert/Oriented     Equipment Currently Used at Home none     Readmission within 30 days? No     Patient currently being followed by outpatient case management? No     Do you currently have service(s) that help you manage your care at home? No     Do you take prescription medications? Yes     Do you have prescription coverage? Yes     Do you have any problems affording any of your prescribed medications? No     Is the patient taking medications as prescribed? yes     Who is going to help you get home at discharge? spouse     How do you get to doctors appointments? car, drives self     Are you on dialysis? No     Do you take coumadin? No     Discharge Plan A Home with family     Discharge Plan B Home     DME Needed Upon Discharge  none     Discharge Plan discussed with: Patient;Spouse/sig other     Name(s) and Number(s) Trece @ bedside     Transition of Care Barriers None

## 2025-01-26 NOTE — CARE UPDATE
01/26/25 1615 01/26/25 1617 01/26/25 1627   Patient Assessment/Suction   Level of Consciousness (AVPU) alert  --  alert   Respiratory Effort Short of breath  --   --    Expansion/Accessory Muscles/Retractions abdominal muscle use;supraclavicular retractions  --   --    All Lung Fields Breath Sounds Anterior:;Lateral:;wheezes, expiratory;crackles, coarse  --   --    Rhythm/Pattern, Respiratory tachypneic;shortness of breath  --   --    PRE-TX-O2   Device (Oxygen Therapy) room air  --  nasal cannula   $ Is the patient on Low Flow Oxygen?  --   --  Yes   Flow (L/min) (Oxygen Therapy)  --   --  2   SpO2 (!) 87 %  --  (!) 94 %   Pulse Oximetry Type Intermittent  --  Intermittent   $ Pulse Oximetry - Multiple Charge Pulse Oximetry - Multiple  --  Pulse Oximetry - Multiple   Pulse 73  --  70   Resp (!) 24  --  (!) 24   Aerosol Therapy   $ Aerosol Therapy Charges  --   --  Aerosol Treatment   Respiratory Treatment Status (SVN)  --   --  given   Treatment Route (SVN)  --   --  mask;oxygen   Patient Position  --   --  HOB elevated   Post Treatment Assessment (SVN)  --   --  increased aeration   Signs of Intolerance (SVN)  --   --  none   Breath Sounds Post-Respiratory Treatment   Throughout All Fields Post-Treatment  --   --  All Fields   Post-treatment Heart Rate (beats/min)  --   --  71   Post-treatment Resp Rate (breaths/min)  --   --  22   Labs   $ Was an ABG obtained?  --  Arterial Puncture;POCT - Blood gas  --    $ Labs Tech Time  --  15 min  --

## 2025-01-26 NOTE — ASSESSMENT & PLAN NOTE
Patient's blood pressure range in the last 24 hours was: BP  Min: 134/74  Max: 152/87.The patient's inpatient anti-hypertensive regimen is listed below:  Current Antihypertensives       Plan  - BP is controlled, no changes needed to their regimen  - Restart home meds

## 2025-01-26 NOTE — ED PROVIDER NOTES
Encounter Date: 1/26/2025       History     Chief Complaint   Patient presents with    Shortness of Breath     No fever  cxr done today      69-year-old male past medical history of MI and stents, hypertension, hyperlipidemia, tobacco abuse, anxiety, presents emergency department with pneumonia.  Patient has not been feeling well for the past 4-5 days.  He says he thought he had the flu but he went to urgent care today tested negative for flu and COVID.  They did a chest x-ray and told him he had pneumonia.  They said they walked him around the place in his oxygen saturation was 87% so they sent him here.  Patient has had increasing shortness of breath over the last week as well.  He has had a cough which is dry.  No reported fever.  He has had some generalized weakness and fatigue.       Review of patient's allergies indicates:   Allergen Reactions    Pcn [penicillins] Itching     Past Medical History:   Diagnosis Date    Anxiety     Hyperlipidemia     Hypertension     MI (myocardial infarction) 01/01/2007    x3     Past Surgical History:   Procedure Laterality Date    CARDIAC CATHETERIZATION  2007    with 2 stents placed    HERNIA REPAIR      inguinal bilaterally     Family History   Problem Relation Name Age of Onset    Kidney disease Mother Mom     Cancer Father Dad      Social History     Tobacco Use    Smoking status: Every Day     Current packs/day: 0.50     Average packs/day: 0.5 packs/day for 40.0 years (20.0 ttl pk-yrs)     Types: Cigarettes    Tobacco comments:     2 CIGERETTE PER DAY   Substance Use Topics    Alcohol use: Yes     Alcohol/week: 5.0 standard drinks of alcohol     Types: 1 Cans of beer, 4 Drinks containing 0.5 oz of alcohol per week     Comment: SOCIALLY    Drug use: No     Review of Systems   Constitutional:  Positive for fatigue. Negative for chills and fever.   HENT:  Negative for sore throat.    Respiratory:  Positive for cough and shortness of breath.    Cardiovascular:  Negative for  chest pain and palpitations.   Gastrointestinal:  Negative for nausea and vomiting.   Genitourinary:  Negative for dysuria and flank pain.   Musculoskeletal:  Negative for back pain.   Skin:  Negative for rash.   Neurological:  Positive for weakness (generalized). Negative for headaches.   Hematological:  Does not bruise/bleed easily.   All other systems reviewed and are negative.      Physical Exam     Initial Vitals [01/26/25 1139]   BP Pulse Resp Temp SpO2   134/74 75 20 98.5 °F (36.9 °C) (!) 90 %      MAP       --         Physical Exam    Nursing note and vitals reviewed.  Constitutional: He appears well-developed and well-nourished. He is not diaphoretic. No distress.   HENT:   Head: Normocephalic and atraumatic. Mouth/Throat: Oropharynx is clear and moist. No oropharyngeal exudate.   Eyes: Conjunctivae and EOM are normal. Pupils are equal, round, and reactive to light.   Neck: Neck supple. No tracheal deviation present.   Cardiovascular:  Normal rate, regular rhythm, normal heart sounds and intact distal pulses.           No murmur heard.  Pulmonary/Chest: No stridor. No respiratory distress. He has wheezes. He has rhonchi. He has rales.   Abdominal: Abdomen is soft. Bowel sounds are normal. He exhibits no distension. There is no abdominal tenderness. There is no rebound and no guarding.   Musculoskeletal:         General: No tenderness or edema. Normal range of motion.      Cervical back: Neck supple.     Neurological: He is alert and oriented to person, place, and time. He has normal strength. No cranial nerve deficit or sensory deficit.   Skin: Skin is warm and dry. Capillary refill takes less than 2 seconds. No rash noted. No erythema. No pallor.   Psychiatric: He has a normal mood and affect. His behavior is normal. Thought content normal.         ED Course   Procedures  Labs Reviewed   CBC W/ AUTO DIFFERENTIAL - Abnormal       Result Value    WBC 12.90 (*)     RBC 4.91      Hemoglobin 15.7       Hematocrit 44.9      MCV 91      MCH 32.0 (*)     MCHC 35.0      RDW 12.3      Platelets 223      MPV 9.4      Immature Granulocytes CANCELED      Immature Grans (Abs) CANCELED      Lymph # CANCELED      Mono # CANCELED      Eos # CANCELED      Baso # CANCELED      nRBC 0      Gran % 80.0 (*)     Lymph % 10.0 (*)     Mono % 8.0      Eosinophil % 0.0      Basophil % 0.0      Bands 2.0      Platelet Estimate Appears normal      Poik Slight      Ovalocytes Occasional      Differential Method Manual     COMPREHENSIVE METABOLIC PANEL - Abnormal    Sodium 132 (*)     Potassium 3.0 (*)     Chloride 95      CO2 23      Glucose 111 (*)     BUN 10      Creatinine 0.7      Calcium 8.7      Total Protein 7.5      Albumin 3.0 (*)     Total Bilirubin 0.8      Alkaline Phosphatase 103      AST 32      ALT 36      eGFR >60      Anion Gap 14     CULTURE, BLOOD   CULTURE, BLOOD   LACTIC ACID, PLASMA    Lactate (Lactic Acid) 1.6     B-TYPE NATRIURETIC PEPTIDE    BNP 37     TROPONIN I    Troponin I <0.006     PROCALCITONIN    Procalcitonin 0.06            Imaging Results    None          Medications   clopidogreL tablet 75 mg (has no administration in time range)   sodium chloride 0.9% flush 10 mL (has no administration in time range)   melatonin tablet 6 mg (has no administration in time range)   enoxaparin injection 40 mg (has no administration in time range)   HYDROcodone-acetaminophen 5-325 mg per tablet 1 tablet (has no administration in time range)   famotidine tablet 20 mg (has no administration in time range)   azithromycin tablet 250 mg (has no administration in time range)   cefTRIAXone injection 2 g (has no administration in time range)   oseltamivir capsule 75 mg (has no administration in time range)   sodium chloride 0.9% flush 3 mL (has no administration in time range)   predniSONE tablet 40 mg (has no administration in time range)   albuterol-ipratropium 2.5 mg-0.5 mg/3 mL nebulizer solution 3 mL (has no administration  in time range)   methylPREDNISolone sodium succinate injection 80 mg (has no administration in time range)   cefTRIAXone injection 2 g (2 g Intravenous Given 1/26/25 1206)   azithromycin tablet 500 mg (500 mg Oral Given 1/26/25 1206)   albuterol-ipratropium 2.5 mg-0.5 mg/3 mL nebulizer solution 3 mL (3 mLs Nebulization Given 1/26/25 1203)   potassium chloride SA CR tablet 40 mEq (40 mEq Oral Given 1/26/25 1320)     Medical Decision Making  Differential includes but not limited to COPD, COVID, flu, pneumonia, CHF, ACS, anemia, electrolyte abnormality,    Emergent evaluation of a 69-year-old male presents emergency department with URI complaints.  Patient has pneumonia on chest x-ray.  Patient is hypoxemic normally does not require oxygen.  He will be treated for community-acquired pneumonia with Rocephin and azithromycin.  He will be admitted to the hospital for further care of symptoms.  He will be given nebulizer therapy.  Patient will be admitted.    A dictation software program was used for this note.  Please expect some simple typographical  errors in this note.     Amount and/or Complexity of Data Reviewed  External Data Reviewed: labs and notes.  Labs: ordered. Decision-making details documented in ED Course.  Radiology: ordered and independent interpretation performed. Decision-making details documented in ED Course.    Risk  OTC drugs.  Prescription drug management.  Decision regarding hospitalization.               ED Course as of 01/26/25 1516   Sun Jan 26, 2025   1341 Cindi from [facility] will admit the patient. [JR]      ED Course User Index  [JR] Kervin Brennan DO                           Clinical Impression:  Final diagnoses:  [J18.9] Pneumonia of left lower lobe due to infectious organism (Primary)  [R09.02] Hypoxemia          ED Disposition Condition    Observation Stable                Kervin Brennan DO  01/26/25 1517

## 2025-01-26 NOTE — CARE UPDATE
Latest Reference Range & Units 01/26/25 16:17   POC PH 7.35 - 7.45  7.473 (H)   POC PCO2 35 - 45 mmHg 35.3   POC PO2 80 - 100 mmHg 64 (L)   POC HCO3 24 - 28 mmol/L 25.9   POC SATURATED O2 95 - 100 % 94   Sample  ARTERIAL   POC TCO2 23 - 27 mmol/L 27   POC BE -2 to 2 mmol/L 2   Flow  2   DelSys  Nasal Can   Site  RR   Mode  SPONT   (H): Data is abnormally high  (L): Data is abnormally low    Results reported to ALFONSO Adams

## 2025-01-26 NOTE — SUBJECTIVE & OBJECTIVE
Past Medical History:   Diagnosis Date    Anxiety     Hyperlipidemia     Hypertension     MI (myocardial infarction) 01/01/2007    x3       Past Surgical History:   Procedure Laterality Date    CARDIAC CATHETERIZATION  2007    with 2 stents placed    HERNIA REPAIR      inguinal bilaterally       Review of patient's allergies indicates:   Allergen Reactions    Pcn [penicillins] Itching       No current facility-administered medications on file prior to encounter.     Current Outpatient Medications on File Prior to Encounter   Medication Sig    atorvastatin (LIPITOR) 80 MG tablet Take 80 mg by mouth every evening.    clopidogreL (PLAVIX) 75 mg tablet Take 75 mg by mouth once daily.    doxycycline (VIBRA-TABS) 100 MG tablet Take 1 tablet (100 mg total) by mouth 2 (two) times daily. for 10 days (Patient taking differently: Take 100 mg by mouth 2 (two) times daily. NEW Rx - NOT YET STARTED)    lisinopril 10 MG tablet Take 10 mg by mouth once daily.    metoprolol succinate (TOPROL-XL) 100 MG 24 hr tablet Take 50 mg by mouth once daily.    ALPRAZolam (XANAX) 0.5 MG tablet Take 0.5 mg by mouth nightly as needed. (Patient not taking: Reported on 1/26/2025)    [DISCONTINUED] hydrocortisone 2.5 % cream Apply topically 2 (two) times daily.    [DISCONTINUED] predniSONE (DELTASONE) 10 MG tablet 3 pills a day for 2 days, then 2 pills a day for 2 days then 1 pill a day     Family History       Problem Relation (Age of Onset)    Cancer Father    Kidney disease Mother          Tobacco Use    Smoking status: Every Day     Current packs/day: 0.50     Average packs/day: 0.5 packs/day for 40.0 years (20.0 ttl pk-yrs)     Types: Cigarettes    Smokeless tobacco: Not on file    Tobacco comments:     2 CIGERETTE PER DAY   Substance and Sexual Activity    Alcohol use: Yes     Alcohol/week: 5.0 standard drinks of alcohol     Types: 1 Cans of beer, 4 Drinks containing 0.5 oz of alcohol per week     Comment: SOCIALLY    Drug use: No    Sexual  activity: Yes     Partners: Female     Birth control/protection: I.U.D.     Review of Systems   Constitutional:  Positive for activity change. Negative for appetite change, chills, diaphoresis and fever.   HENT:  Positive for postnasal drip. Negative for congestion, hearing loss, sore throat, tinnitus and trouble swallowing.    Eyes:  Negative for photophobia, discharge, itching and visual disturbance.   Respiratory:  Positive for cough and shortness of breath. Negative for apnea, wheezing and stridor.    Cardiovascular:  Negative for chest pain, palpitations and leg swelling.   Gastrointestinal:  Negative for abdominal distention, abdominal pain, blood in stool, constipation, diarrhea and nausea.   Endocrine: Negative for polydipsia, polyphagia and polyuria.   Genitourinary:  Negative for difficulty urinating, dysuria, flank pain and frequency.   Musculoskeletal:  Negative for arthralgias, joint swelling and neck stiffness.   Skin:  Negative for color change, rash and wound.   Neurological:  Negative for dizziness, tremors, seizures, light-headedness, numbness and headaches.   Hematological:  Negative for adenopathy.   Psychiatric/Behavioral:  Negative for hallucinations and self-injury.      Objective:     Vital Signs (Most Recent):  Temp: 98.4 °F (36.9 °C) (01/26/25 1500)  Pulse: 71 (01/26/25 1500)  Resp: 20 (01/26/25 1500)  BP: (!) 166/81 (01/26/25 1500)  SpO2: 95 % (01/26/25 1500) Vital Signs (24h Range):  Temp:  [98 °F (36.7 °C)-98.5 °F (36.9 °C)] 98.4 °F (36.9 °C)  Pulse:  [71-91] 71  Resp:  [19-20] 20  SpO2:  [89 %-96 %] 95 %  BP: (134-166)/() 166/81     Weight: 77.1 kg (170 lb)  Body mass index is 23.06 kg/m².     Physical Exam  Constitutional:       Appearance: He is well-developed.   HENT:      Head: Normocephalic and atraumatic.   Eyes:      General: Lids are normal.      Conjunctiva/sclera: Conjunctivae normal.   Neck:      Thyroid: No thyroid mass.      Vascular: No JVD.   Cardiovascular:       "Heart sounds: S1 normal and S2 normal. No murmur heard.  Pulmonary:      Comments: Increased WOB - shortness of breath at rest  Abdominal:      General: Bowel sounds are normal. There is no distension or abdominal bruit.      Palpations: Abdomen is soft. There is no hepatomegaly or splenomegaly.      Tenderness: There is no abdominal tenderness.   Musculoskeletal:      Cervical back: Full passive range of motion without pain and neck supple. No edema.   Lymphadenopathy:      Cervical: No cervical adenopathy.   Skin:     General: Skin is warm and dry.   Neurological:      Mental Status: He is alert.      Motor: No tremor or seizure activity.      Deep Tendon Reflexes: Reflexes are normal and symmetric.   Psychiatric:         Speech: Speech normal.         Behavior: Behavior is cooperative.         Thought Content: Thought content normal.                Significant Labs: A1C: No results for input(s): "HGBA1C" in the last 4320 hours.  Blood Culture: No results for input(s): "LABBLOO" in the last 48 hours.  CBC:   Recent Labs   Lab 01/26/25  1212   WBC 12.90*   HGB 15.7   HCT 44.9        CMP:   Recent Labs   Lab 01/26/25  1212   *   K 3.0*   CL 95   CO2 23   *   BUN 10   CREATININE 0.7   CALCIUM 8.7   PROT 7.5   ALBUMIN 3.0*   BILITOT 0.8   ALKPHOS 103   AST 32   ALT 36   ANIONGAP 14     Lactic Acid:   Recent Labs   Lab 01/26/25  1212   LACTATE 1.6     Troponin:   Recent Labs   Lab 01/26/25  1212   TROPONINI <0.006       Significant Imaging: I have reviewed all pertinent imaging results/findings within the past 24 hours.  Imaging Results    None         "

## 2025-01-26 NOTE — ASSESSMENT & PLAN NOTE
Hyperkalemia is likely due to  dehydration .The patients most recent potassium results are listed below.  Recent Labs     01/26/25  1212   K 3.0*     Plan  - Monitor for arrhythmias with EKG and/or continuous telemetry.   - Treat the hyperkalemia with  po supplementation .   - Monitor potassium: Daily  - The patient's hyperkalemia is stable

## 2025-01-26 NOTE — ASSESSMENT & PLAN NOTE
Contributory to symptoms  States he quit 10 days ago  CXR significant for underlying COPD  COPD pathway  Offer nicotine patch - refuses  Continue smoking cessation

## 2025-01-26 NOTE — HPI
"Prosper Hua is a 69 y.o. male with  has a past medical history of Anxiety, Hyperlipidemia, Hypertension, and MI (myocardial infarction) (01/01/2007). who presented to the ED with a Shortness of Breath (No fever  cxr done today )    Patient present with c/o 5 day progressively worsening shortness of breath with associated activity intolerance, mild cough and post nasal drip. He was found to be [id number] with oxygen sats dipping to 87-88% on 2L oxygen. He recovers to 90% on 3L. Per patient 1 week ago he had sick contact with a coworker who was diagnosed with influenza (unknown which). He endorses that he exposed his wife who in turn tested positive and was treated for flu. Patient reports that he started feeling unwell on Saturday 01/18/25, but states he started to feel better on Sunday and Monday of same week. By Wednesday he began to go "downhill" again. Patient denies fever. He smoked 1 pack of cigarettes daily (quit smoking 10 days ago).    Lactic, procal, influenza A/B, and covid negative. Troponin 1 level and BNP negative. CXR significant for upper left lobe developing pulmonary infiltrate as well as noting underlying COPD. Patient denies ever having a diagnosis of COPD. He has mild Leukocytosis, hyponatremia = 132, and hypokalemia 3.0. BC pending.  "

## 2025-01-26 NOTE — H&P
"[facility] Medicine  History & Physical    Patient Name: Prosper Hua  MRN: [id number]  Patient Class: OP- Observation  Admission Date: 1/26/2025  Attending Physician: Romelia Willett MD   Primary Care Provider: Ervin Batista MD         Patient information was obtained from patient, spouse/SO, and ER records.     Subjective:     Principal Problem:Left upper lobe pneumonia    Chief Complaint:   Chief Complaint   Patient presents with    Shortness of Breath     No fever  cxr done today         HPI: Prosper Hua is a 69 y.o. male with  has a past medical history of Anxiety, Hyperlipidemia, Hypertension, and MI (myocardial infarction) (01/01/2007). who presented to the ED with a Shortness of Breath (No fever  cxr done today )    Patient present with c/o 5 day progressively worsening shortness of breath with associated activity intolerance, mild cough and post nasal drip. He was found to be [id number] with oxygen sats dipping to 87-88% on 2L oxygen. He recovers to 90% on 3L. Per patient 1 week ago he had sick contact with a coworker who was diagnosed with influenza (unknown which). He endorses that he exposed his wife who in turn tested positive and was treated for flu. Patient reports that he started feeling unwell on Saturday 01/18/25, but states he started to feel better on Sunday and Monday of same week. By Wednesday he began to go "downhill" again. Patient denies fever. He smoked 1 pack of cigarettes daily (quit smoking 10 days ago).    Lactic, procal, influenza A/B, and covid negative. Troponin 1 level and BNP negative. CXR significant for upper left lobe developing pulmonary infiltrate as well as noting underlying COPD. Patient denies ever having a diagnosis of COPD. He has mild Leukocytosis, hyponatremia = 132, and hypokalemia 3.0. BC pending.    Past Medical History:   Diagnosis Date    Anxiety     Hyperlipidemia     Hypertension     MI (myocardial infarction) 01/01/2007 "    x3       Past Surgical History:   Procedure Laterality Date    CARDIAC CATHETERIZATION  2007    with 2 stents placed    HERNIA REPAIR      inguinal bilaterally       Review of patient's allergies indicates:   Allergen Reactions    Pcn [penicillins] Itching       No current facility-administered medications on file prior to encounter.     Current Outpatient Medications on File Prior to Encounter   Medication Sig    atorvastatin (LIPITOR) 80 MG tablet Take 80 mg by mouth every evening.    clopidogreL (PLAVIX) 75 mg tablet Take 75 mg by mouth once daily.    doxycycline (VIBRA-TABS) 100 MG tablet Take 1 tablet (100 mg total) by mouth 2 (two) times daily. for 10 days (Patient taking differently: Take 100 mg by mouth 2 (two) times daily. NEW Rx - NOT YET STARTED)    lisinopril 10 MG tablet Take 10 mg by mouth once daily.    metoprolol succinate (TOPROL-XL) 100 MG 24 hr tablet Take 50 mg by mouth once daily.    ALPRAZolam (XANAX) 0.5 MG tablet Take 0.5 mg by mouth nightly as needed. (Patient not taking: Reported on 1/26/2025)    [DISCONTINUED] hydrocortisone 2.5 % cream Apply topically 2 (two) times daily.    [DISCONTINUED] predniSONE (DELTASONE) 10 MG tablet 3 pills a day for 2 days, then 2 pills a day for 2 days then 1 pill a day     Family History       Problem Relation (Age of Onset)    Cancer Father    Kidney disease Mother          Tobacco Use    Smoking status: Every Day     Current packs/day: 0.50     Average packs/day: 0.5 packs/day for 40.0 years (20.0 ttl pk-yrs)     Types: Cigarettes    Smokeless tobacco: Not on file    Tobacco comments:     2 CIGERETTE PER DAY   Substance and Sexual Activity    Alcohol use: Yes     Alcohol/week: 5.0 standard drinks of alcohol     Types: 1 Cans of beer, 4 Drinks containing 0.5 oz of alcohol per week     Comment: SOCIALLY    Drug use: No    Sexual activity: Yes     Partners: Female     Birth control/protection: I.U.D.     Review of Systems   Constitutional:  Positive for  activity change. Negative for appetite change, chills, diaphoresis and fever.   HENT:  Positive for postnasal drip. Negative for congestion, hearing loss, sore throat, tinnitus and trouble swallowing.    Eyes:  Negative for photophobia, discharge, itching and visual disturbance.   Respiratory:  Positive for cough and shortness of breath. Negative for apnea, wheezing and stridor.    Cardiovascular:  Negative for chest pain, palpitations and leg swelling.   Gastrointestinal:  Negative for abdominal distention, abdominal pain, blood in stool, constipation, diarrhea and nausea.   Endocrine: Negative for polydipsia, polyphagia and polyuria.   Genitourinary:  Negative for difficulty urinating, dysuria, flank pain and frequency.   Musculoskeletal:  Negative for arthralgias, joint swelling and neck stiffness.   Skin:  Negative for color change, rash and wound.   Neurological:  Negative for dizziness, tremors, seizures, light-headedness, numbness and headaches.   Hematological:  Negative for adenopathy.   Psychiatric/Behavioral:  Negative for hallucinations and self-injury.      Objective:     Vital Signs (Most Recent):  Temp: 98.4 °F (36.9 °C) (01/26/25 1500)  Pulse: 71 (01/26/25 1500)  Resp: 20 (01/26/25 1500)  BP: (!) 166/81 (01/26/25 1500)  SpO2: 95 % (01/26/25 1500) Vital Signs (24h Range):  Temp:  [98 °F (36.7 °C)-98.5 °F (36.9 °C)] 98.4 °F (36.9 °C)  Pulse:  [71-91] 71  Resp:  [19-20] 20  SpO2:  [89 %-96 %] 95 %  BP: (134-166)/() 166/81     Weight: 77.1 kg (170 lb)  Body mass index is 23.06 kg/m².     Physical Exam  Constitutional:       Appearance: He is well-developed.   HENT:      Head: Normocephalic and atraumatic.   Eyes:      General: Lids are normal.      Conjunctiva/sclera: Conjunctivae normal.   Neck:      Thyroid: No thyroid mass.      Vascular: No JVD.   Cardiovascular:      Heart sounds: S1 normal and S2 normal. No murmur heard.  Pulmonary:      Comments: Increased WOB - shortness of breath at  "rest  Abdominal:      General: Bowel sounds are normal. There is no distension or abdominal bruit.      Palpations: Abdomen is soft. There is no hepatomegaly or splenomegaly.      Tenderness: There is no abdominal tenderness.   Musculoskeletal:      Cervical back: Full passive range of motion without pain and neck supple. No edema.   Lymphadenopathy:      Cervical: No cervical adenopathy.   Skin:     General: Skin is warm and dry.   Neurological:      Mental Status: He is alert.      Motor: No tremor or seizure activity.      Deep Tendon Reflexes: Reflexes are normal and symmetric.   Psychiatric:         Speech: Speech normal.         Behavior: Behavior is cooperative.         Thought Content: Thought content normal.                Significant Labs: A1C: No results for input(s): "HGBA1C" in the last 4320 hours.  Blood Culture: No results for input(s): "LABBLOO" in the last 48 hours.  CBC:   Recent Labs   Lab 01/26/25  1212   WBC 12.90*   HGB 15.7   HCT 44.9        CMP:   Recent Labs   Lab 01/26/25  1212   *   K 3.0*   CL 95   CO2 23   *   BUN 10   CREATININE 0.7   CALCIUM 8.7   PROT 7.5   ALBUMIN 3.0*   BILITOT 0.8   ALKPHOS 103   AST 32   ALT 36   ANIONGAP 14     Lactic Acid:   Recent Labs   Lab 01/26/25  1212   LACTATE 1.6     Troponin:   Recent Labs   Lab 01/26/25  1212   TROPONINI <0.006       Significant Imaging: I have reviewed all pertinent imaging results/findings within the past 24 hours.  Imaging Results    None         Assessment/Plan:     * Left upper lobe pneumonia  Patient has a diagnosis of pneumonia. The cause of the pneumonia is viral in etiology due to  unclear . The pneumonia is improving. The patient has the following signs/symptoms of pneumonia: persistent hypoxia , cough, and shortness of breath. The patient does have a current oxygen requirement and the patient does not have a home oxygen requirement. I have reviewed the pertinent imaging. The following cultures have been " collected: Blood cultures The culture results are listed below.     Current antimicrobial regimen consists of the antibiotics listed below. Will monitor patient closely and continue current treatment plan unchanged.    Antibiotics (From admission, onward)      None            Microbiology Results (last 7 days)       Procedure Component Value Units Date/Time    Blood culture #1 [[id number]] Collected: 01/26/25 1212    Order Status: Sent Specimen: Blood from Peripheral, Antecubital, Right     Blood culture #2 [[id number]] Collected: 01/26/25 1212    Order Status: Sent Specimen: Blood from Peripheral, Antecubital, Left             Acute hypoxemic respiratory failure  Patient with Hypoxic Respiratory failure which is Acute.  he is not on home oxygen. Supplemental oxygen was provided and noted-    Signs/symptoms of respiratory failure include- respiratory distress. Contributing diagnoses includes - Pneumonia Labs and images were reviewed. Patient Has not had a recent ABG. Will treat underlying causes and adjust management of respiratory failure as follows- supplemental oxygen  -Tamiflu  -azithromycin + rocephin for now - convert to po at discharge for cap coverage  -Supplemental oxygen  -duo nebs and steroids    Hypokalemia  Hyperkalemia is likely due to  dehydration .The patients most recent potassium results are listed below.  Recent Labs     01/26/25 1212   K 3.0*     Plan  - Monitor for arrhythmias with EKG and/or continuous telemetry.   - Treat the hyperkalemia with  po supplementation .   - Monitor potassium: Daily  - The patient's hyperkalemia is stable            Smoker  Contributory to symptoms  States he quit 10 days ago  CXR significant for underlying COPD  COPD pathway  Offer nicotine patch - refuses  Continue smoking cessation      Hypertension  Patient's blood pressure range in the last 24 hours was: BP  Min: 134/74  Max: 152/87.The patient's inpatient anti-hypertensive regimen is listed below:  Current  Antihypertensives       Plan  - BP is controlled, no changes needed to their regimen  - Restart home meds    Anxiety  Continue home alprazolam        VTE Risk Mitigation (From admission, onward)           Ordered     enoxaparin injection 40 mg  Daily         01/26/25 1342     IP VTE LOW RISK PATIENT  Once         01/26/25 1342                         On 01/26/2025, patient should be placed in hospital observation services under my care in collaboration with Dr. Willett.             Cindi Chan, DNP, APRN, FNP-C  [facility]  [facility]  [email or web address]

## 2025-01-26 NOTE — PROGRESS NOTES
Subjective:      Patient ID: Prosper Hua is a 69 y.o. male.    Vitals:  height is 6' (1.829 m) and weight is 77.1 kg (170 lb). His temperature is 98 °F (36.7 °C). His blood pressure is 152/87 (abnormal) and his pulse is 78. His respiration is 19 and oxygen saturation is 91% (abnormal).     Chief Complaint: Cough and Sinus Problem    69-year-old male presents for evaluation. Patient states he thinks he's had flu or covid x 5-6 days. Complains of cough, chest congestion but isnt coughing anything up, sinus congestion, ears clogged, sore throat, body aches.  He also reports some chest tightness and increased shortness of breath.  Lung sounds are clear with some diminished breath sounds in the right lower fields.  He denies any wheezing or rattling at home.  He reports his oxygen at previous doctor's visits has been about 95-96% but today we have a reading of 91%.  Patient is able to speak in full and complete sentences.  He has a rather extensive cardiac history including hypertension, coronary artery disease, MI.  He denies chest pain just shortness of breath related to his coughing.        Constitution: Positive for chills, fatigue and fever (subjective).   HENT:  Positive for congestion, postnasal drip and sore throat.    Respiratory:  Positive for chest tightness, cough and shortness of breath. Negative for wheezing.    Musculoskeletal: Negative.       Objective:     Physical Exam   Constitutional: He is oriented to person, place, and time. He appears well-developed. He is cooperative.  Non-toxic appearance. He does not appear ill. No distress.   HENT:   Head: Normocephalic and atraumatic.   Ears:   Right Ear: Hearing, tympanic membrane, external ear and ear canal normal.   Left Ear: Hearing, tympanic membrane, external ear and ear canal normal.   Nose: Rhinorrhea and congestion present. No mucosal edema or nasal deformity. No epistaxis. Right sinus exhibits no maxillary sinus tenderness and no frontal sinus  tenderness. Left sinus exhibits no maxillary sinus tenderness and no frontal sinus tenderness.   Mouth/Throat: Uvula is midline, oropharynx is clear and moist and mucous membranes are normal. Mucous membranes are moist. No trismus in the jaw. Normal dentition. No uvula swelling. No oropharyngeal exudate, posterior oropharyngeal edema or posterior oropharyngeal erythema.   Eyes: Conjunctivae and lids are normal. No scleral icterus.   Neck: Trachea normal and phonation normal. Neck supple. No edema present. No erythema present. No neck rigidity present.   Cardiovascular: Normal rate, regular rhythm and normal heart sounds.   Pulmonary/Chest: Effort normal. No respiratory distress. He has decreased breath sounds in the right lower field. He has no wheezes. He has no rhonchi. He has no rales.   Abdominal: Normal appearance.   Musculoskeletal: Normal range of motion.         General: No deformity. Normal range of motion.   Neurological: He is alert and oriented to person, place, and time. He displays no weakness. He exhibits normal muscle tone.   Skin: Skin is warm, dry, intact, not diaphoretic and not pale.   Psychiatric: His speech is normal and behavior is normal. Mood, judgment and thought content normal.   Nursing note and vitals reviewed.      Assessment:     1. Pneumonia of left upper lobe due to infectious organism    2. Cough, unspecified type    3. Shortness of breath        Plan:       Pneumonia of left upper lobe due to infectious organism  -     doxycycline (VIBRA-TABS) 100 MG tablet; Take 1 tablet (100 mg total) by mouth 2 (two) times daily. for 10 days  Dispense: 20 tablet; Refill: 0    Cough, unspecified type  -     SARS Coronavirus 2 Antigen, POCT Manual Read  -     POCT Influenza A/B Rapid Antigen    Shortness of breath  -     XR CHEST PA AND LATERAL; Future; Expected date: 01/26/2025  -     Cancel: EKG 12-lead; Future      COVID: neg  FLU: neg    CXR: Reveals left upper lobe infiltrates   Walking O2  evaluation: 87% while walking. No tachycardia    Patient is sent to the ER for further evaluation due to walking O2 evaluation results and continued shortness of breath.  Triage nurse at [facility] notified via secure chat.

## 2025-01-26 NOTE — ASSESSMENT & PLAN NOTE
Patient with Hypoxic Respiratory failure which is Acute.  he is not on home oxygen. Supplemental oxygen was provided and noted-    Signs/symptoms of respiratory failure include- respiratory distress. Contributing diagnoses includes - Pneumonia Labs and images were reviewed. Patient Has not had a recent ABG. Will treat underlying causes and adjust management of respiratory failure as follows- supplemental oxygen  -Tamiflu  -azithromycin + rocephin for now - convert to po at discharge for cap coverage  -Supplemental oxygen  -duo nebs and steroids

## 2025-01-27 VITALS
TEMPERATURE: 98 F | SYSTOLIC BLOOD PRESSURE: 136 MMHG | OXYGEN SATURATION: 90 % | HEART RATE: 98 BPM | BODY MASS INDEX: 23.84 KG/M2 | DIASTOLIC BLOOD PRESSURE: 74 MMHG | HEIGHT: 72 IN | WEIGHT: 176 LBS | RESPIRATION RATE: 18 BRPM

## 2025-01-27 PROBLEM — J44.1 COPD EXACERBATION: Status: ACTIVE | Noted: 2025-01-27

## 2025-01-27 LAB
ANION GAP SERPL CALC-SCNC: 13 MMOL/L (ref 8–16)
BASOPHILS # BLD AUTO: ABNORMAL K/UL (ref 0–0.2)
BASOPHILS NFR BLD: 0 % (ref 0–1.9)
BILIRUB UR QL STRIP: NEGATIVE
BUN SERPL-MCNC: 10 MG/DL (ref 8–23)
CALCIUM SERPL-MCNC: 8.3 MG/DL (ref 8.7–10.5)
CHLORIDE SERPL-SCNC: 96 MMOL/L (ref 95–110)
CLARITY UR: CLEAR
CO2 SERPL-SCNC: 25 MMOL/L (ref 23–29)
COLOR UR: YELLOW
CREAT SERPL-MCNC: 0.7 MG/DL (ref 0.5–1.4)
DIFFERENTIAL METHOD BLD: ABNORMAL
EOSINOPHIL # BLD AUTO: ABNORMAL K/UL (ref 0–0.5)
EOSINOPHIL NFR BLD: 0 % (ref 0–8)
ERYTHROCYTE [DISTWIDTH] IN BLOOD BY AUTOMATED COUNT: 12.4 % (ref 11.5–14.5)
EST. GFR  (NO RACE VARIABLE): >60 ML/MIN/1.73 M^2
GLUCOSE SERPL-MCNC: 216 MG/DL (ref 70–110)
GLUCOSE UR QL STRIP: ABNORMAL
HCT VFR BLD AUTO: 41.7 % (ref 40–54)
HGB BLD-MCNC: 14.5 G/DL (ref 14–18)
HGB UR QL STRIP: NEGATIVE
IMM GRANULOCYTES # BLD AUTO: ABNORMAL K/UL (ref 0–0.04)
IMM GRANULOCYTES NFR BLD AUTO: ABNORMAL % (ref 0–0.5)
KETONES UR QL STRIP: ABNORMAL
LEUKOCYTE ESTERASE UR QL STRIP: NEGATIVE
LYMPHOCYTES # BLD AUTO: ABNORMAL K/UL (ref 1–4.8)
LYMPHOCYTES NFR BLD: 4 % (ref 18–48)
MAGNESIUM SERPL-MCNC: 1.9 MG/DL (ref 1.6–2.6)
MCH RBC QN AUTO: 31.6 PG (ref 27–31)
MCHC RBC AUTO-ENTMCNC: 34.8 G/DL (ref 32–36)
MCV RBC AUTO: 91 FL (ref 82–98)
MONOCYTES # BLD AUTO: ABNORMAL K/UL (ref 0.3–1)
MONOCYTES NFR BLD: 8 % (ref 4–15)
NEUTROPHILS NFR BLD: 86 % (ref 38–73)
NEUTS BAND NFR BLD MANUAL: 2 %
NITRITE UR QL STRIP: NEGATIVE
NRBC BLD-RTO: 0 /100 WBC
PH UR STRIP: 6 [PH] (ref 5–8)
PHOSPHATE SERPL-MCNC: 1.4 MG/DL (ref 2.7–4.5)
PLATELET # BLD AUTO: 276 K/UL (ref 150–450)
PLATELET BLD QL SMEAR: ABNORMAL
PMV BLD AUTO: 9.1 FL (ref 9.2–12.9)
POLYCHROMASIA BLD QL SMEAR: ABNORMAL
POTASSIUM SERPL-SCNC: 3.2 MMOL/L (ref 3.5–5.1)
PROT UR QL STRIP: ABNORMAL
RBC # BLD AUTO: 4.59 M/UL (ref 4.6–6.2)
SODIUM SERPL-SCNC: 134 MMOL/L (ref 136–145)
SP GR UR STRIP: 1.01 (ref 1–1.03)
URN SPEC COLLECT METH UR: ABNORMAL
UROBILINOGEN UR STRIP-ACNC: NEGATIVE EU/DL
WBC # BLD AUTO: 9.05 K/UL (ref 3.9–12.7)

## 2025-01-27 PROCEDURE — 27000221 HC OXYGEN, UP TO 24 HOURS

## 2025-01-27 PROCEDURE — 96376 TX/PRO/DX INJ SAME DRUG ADON: CPT

## 2025-01-27 PROCEDURE — 25000003 PHARM REV CODE 250: Performed by: NURSE PRACTITIONER

## 2025-01-27 PROCEDURE — 99900031 HC PATIENT EDUCATION (STAT)

## 2025-01-27 PROCEDURE — 85007 BL SMEAR W/DIFF WBC COUNT: CPT | Performed by: NURSE PRACTITIONER

## 2025-01-27 PROCEDURE — 63600175 PHARM REV CODE 636 W HCPCS: Mod: JZ,TB | Performed by: NURSE PRACTITIONER

## 2025-01-27 PROCEDURE — 94761 N-INVAS EAR/PLS OXIMETRY MLT: CPT

## 2025-01-27 PROCEDURE — 80048 BASIC METABOLIC PNL TOTAL CA: CPT | Performed by: NURSE PRACTITIONER

## 2025-01-27 PROCEDURE — 94640 AIRWAY INHALATION TREATMENT: CPT | Mod: XB

## 2025-01-27 PROCEDURE — 84100 ASSAY OF PHOSPHORUS: CPT | Performed by: NURSE PRACTITIONER

## 2025-01-27 PROCEDURE — 63700000 PHARM REV CODE 250 ALT 637 W/O HCPCS: Performed by: NURSE PRACTITIONER

## 2025-01-27 PROCEDURE — 94618 PULMONARY STRESS TESTING: CPT

## 2025-01-27 PROCEDURE — 83735 ASSAY OF MAGNESIUM: CPT | Performed by: NURSE PRACTITIONER

## 2025-01-27 PROCEDURE — 25000242 PHARM REV CODE 250 ALT 637 W/ HCPCS: Performed by: NURSE PRACTITIONER

## 2025-01-27 PROCEDURE — 85027 COMPLETE CBC AUTOMATED: CPT | Performed by: NURSE PRACTITIONER

## 2025-01-27 PROCEDURE — 81003 URINALYSIS AUTO W/O SCOPE: CPT | Performed by: NURSE PRACTITIONER

## 2025-01-27 PROCEDURE — G0378 HOSPITAL OBSERVATION PER HR: HCPCS

## 2025-01-27 PROCEDURE — 36415 COLL VENOUS BLD VENIPUNCTURE: CPT | Performed by: NURSE PRACTITIONER

## 2025-01-27 RX ORDER — METHYLPREDNISOLONE 4 MG/1
TABLET ORAL
Qty: 21 EACH | Refills: 0 | Status: SHIPPED | OUTPATIENT
Start: 2025-01-27 | End: 2025-02-03 | Stop reason: ALTCHOICE

## 2025-01-27 RX ORDER — AZITHROMYCIN 250 MG/1
250 TABLET, FILM COATED ORAL DAILY
Qty: 3 TABLET | Refills: 0 | Status: SHIPPED | OUTPATIENT
Start: 2025-01-28 | End: 2025-02-03 | Stop reason: ALTCHOICE

## 2025-01-27 RX ORDER — POTASSIUM CHLORIDE 750 MG/1
10 TABLET, EXTENDED RELEASE ORAL DAILY
Qty: 5 TABLET | Refills: 0 | Status: SHIPPED | OUTPATIENT
Start: 2025-01-27 | End: 2025-02-01

## 2025-01-27 RX ORDER — POTASSIUM CHLORIDE 20 MEQ/1
40 TABLET, EXTENDED RELEASE ORAL ONCE
Status: COMPLETED | OUTPATIENT
Start: 2025-01-27 | End: 2025-01-27

## 2025-01-27 RX ORDER — LISINOPRIL 10 MG/1
20 TABLET ORAL DAILY
Status: DISCONTINUED | OUTPATIENT
Start: 2025-01-27 | End: 2025-01-27 | Stop reason: HOSPADM

## 2025-01-27 RX ORDER — FLUTICASONE PROPIONATE AND SALMETEROL 100; 50 UG/1; UG/1
1 POWDER RESPIRATORY (INHALATION) 2 TIMES DAILY
Qty: 1 EACH | Refills: 3 | Status: SHIPPED | OUTPATIENT
Start: 2025-01-27 | End: 2026-01-27

## 2025-01-27 RX ORDER — OSELTAMIVIR PHOSPHATE 75 MG/1
75 CAPSULE ORAL 2 TIMES DAILY
Qty: 10 CAPSULE | Refills: 0 | Status: SHIPPED | OUTPATIENT
Start: 2025-01-27 | End: 2025-02-01

## 2025-01-27 RX ORDER — DOXYCYCLINE HYCLATE 100 MG
100 TABLET ORAL EVERY 12 HOURS
Status: DISCONTINUED | OUTPATIENT
Start: 2025-01-27 | End: 2025-01-27 | Stop reason: HOSPADM

## 2025-01-27 RX ORDER — LISINOPRIL 20 MG/1
20 TABLET ORAL DAILY
Qty: 90 TABLET | Refills: 3 | Status: SHIPPED | OUTPATIENT
Start: 2025-01-28 | End: 2026-01-28

## 2025-01-27 RX ORDER — IPRATROPIUM BROMIDE AND ALBUTEROL SULFATE 2.5; .5 MG/3ML; MG/3ML
3 SOLUTION RESPIRATORY (INHALATION) EVERY 6 HOURS PRN
Qty: 75 ML | Refills: 2 | Status: SHIPPED | OUTPATIENT
Start: 2025-01-27 | End: 2026-01-27

## 2025-01-27 RX ORDER — IBUPROFEN 200 MG
1 TABLET ORAL DAILY
Qty: 21 PATCH | Refills: 3 | Status: SHIPPED | OUTPATIENT
Start: 2025-01-27

## 2025-01-27 RX ORDER — DOXYCYCLINE HYCLATE 100 MG
100 TABLET ORAL EVERY 12 HOURS
Qty: 8 TABLET | Refills: 0 | Status: SHIPPED | OUTPATIENT
Start: 2025-01-27 | End: 2025-01-31

## 2025-01-27 RX ADMIN — POTASSIUM & SODIUM PHOSPHATES POWDER PACK 280-160-250 MG 2 PACKET: 280-160-250 PACK at 11:01

## 2025-01-27 RX ADMIN — POTASSIUM CHLORIDE 40 MEQ: 1500 TABLET, EXTENDED RELEASE ORAL at 08:01

## 2025-01-27 RX ADMIN — LISINOPRIL 20 MG: 10 TABLET ORAL at 08:01

## 2025-01-27 RX ADMIN — OSELTAMIVIR PHOSPHATE 75 MG: 75 CAPSULE ORAL at 08:01

## 2025-01-27 RX ADMIN — FAMOTIDINE 20 MG: 20 TABLET, FILM COATED ORAL at 08:01

## 2025-01-27 RX ADMIN — POTASSIUM & SODIUM PHOSPHATES POWDER PACK 280-160-250 MG 2 PACKET: 280-160-250 PACK at 09:01

## 2025-01-27 RX ADMIN — ALPRAZOLAM 0.5 MG: 0.25 TABLET ORAL at 02:01

## 2025-01-27 RX ADMIN — PREDNISONE 40 MG: 20 TABLET ORAL at 08:01

## 2025-01-27 RX ADMIN — IPRATROPIUM BROMIDE AND ALBUTEROL SULFATE 3 ML: .5; 3 SOLUTION RESPIRATORY (INHALATION) at 11:01

## 2025-01-27 RX ADMIN — METHYLPREDNISOLONE SODIUM SUCCINATE 80 MG: 40 INJECTION, POWDER, FOR SOLUTION INTRAMUSCULAR; INTRAVENOUS at 04:01

## 2025-01-27 RX ADMIN — AZITHROMYCIN DIHYDRATE 250 MG: 250 TABLET ORAL at 08:01

## 2025-01-27 RX ADMIN — HYDROCODONE BITARTRATE AND ACETAMINOPHEN 1 TABLET: 5; 325 TABLET ORAL at 08:01

## 2025-01-27 RX ADMIN — IPRATROPIUM BROMIDE AND ALBUTEROL SULFATE 3 ML: .5; 3 SOLUTION RESPIRATORY (INHALATION) at 12:01

## 2025-01-27 RX ADMIN — METOPROLOL SUCCINATE 50 MG: 50 TABLET, EXTENDED RELEASE ORAL at 08:01

## 2025-01-27 RX ADMIN — DOXYCYCLINE HYCLATE 100 MG: 100 TABLET, COATED ORAL at 08:01

## 2025-01-27 RX ADMIN — IPRATROPIUM BROMIDE AND ALBUTEROL SULFATE 3 ML: .5; 3 SOLUTION RESPIRATORY (INHALATION) at 06:01

## 2025-01-27 RX ADMIN — IPRATROPIUM BROMIDE AND ALBUTEROL SULFATE 3 ML: .5; 3 SOLUTION RESPIRATORY (INHALATION) at 04:01

## 2025-01-27 NOTE — PLAN OF CARE
Patient clear to dc from CM standpoint.  Portable O2 and nebulizer delivered to bedside.  DC clinic appointment scheduled.  Pulmonology referral placed and office will contact patient to schedule.  Pt declines HH.  Spouse to provide transportation home.     01/27/25 1233   Final Note   Assessment Type Final Discharge Note   Anticipated Discharge Disposition Home   What phone number can be called within the next 1-3 days to see how you are doing after discharge? [phone]   Hospital Resources/Appts/Education Provided Appointments scheduled and added to AVS;Post-Acute resouces added to AVS   Post-Acute Status   Post-Acute Authorization E   [facility] Status Set-up Complete/Auth obtained   Discharge Delays None known at this time

## 2025-01-27 NOTE — PLAN OF CARE
POC reviewed with pt, verbalized understanding. Pt AAO x 4, able to make needs known. Meds given per MAR. PRNs to maintain comfort. IV intact and patent. Purposeful q2hr rounding completed. Safety maintained with side rails up x3, bed wheels locked, bed in lowest position, and call light in reach. Pt educated to call for assistance with ambulation and continue to wear nonslip socks; pt verbalized understanding. Pt remains free of falls. No further needs expressed at this time.     Problem: COPD (Chronic Obstructive Pulmonary Disease)  Goal: Optimal Chronic Illness Coping  Outcome: Progressing     Problem: COPD (Chronic Obstructive Pulmonary Disease)  Goal: Improved Oral Intake  Outcome: Progressing     Problem: Pneumonia  Goal: Fluid Balance  Outcome: Progressing

## 2025-01-27 NOTE — ASSESSMENT & PLAN NOTE
Patient's COPD is with exacerbation noted by continued dyspnea currently.  Patient is currently on COPD Pathway. Continue scheduled inhalers Steroids, Antibiotics, and Supplemental oxygen and monitor respiratory status closely.   -CT noted  1. Subtle increased markings lingular segment of the left upper lobe suspicious for developing pulmonary infiltrate.  Correlate clinically with possible fever and/or elevated white count.  2. Underlying COPD.

## 2025-01-27 NOTE — CARE UPDATE
01/26/25 2004   Patient Assessment/Suction   Level of Consciousness (AVPU) alert   Respiratory Effort Normal;Unlabored   Expansion/Accessory Muscles/Retractions abdominal muscle use;diaphragmatic   All Lung Fields Breath Sounds Anterior:;Lateral:;crackles, fine;crackles, coarse   Rhythm/Pattern, Respiratory no shortness of breath reported  (but states he was asleep)   Cough Frequency frequent   Cough Type congested;nonproductive   PRE-TX-O2   Device (Oxygen Therapy) nasal cannula   $ Is the patient on Low Flow Oxygen? Yes   Flow (L/min) (Oxygen Therapy) 2   SpO2 (!) 94 %   Pulse Oximetry Type Intermittent   $ Pulse Oximetry - Multiple Charge Pulse Oximetry - Multiple   Pulse 76   Resp 18   Aerosol Therapy   $ Aerosol Therapy Charges Aerosol Treatment   Daily Review of Necessity (SVN) completed   Respiratory Treatment Status (SVN) given   Treatment Route (SVN) mask;oxygen   Patient Position HOB elevated   Post Treatment Assessment (SVN) increased aeration   Signs of Intolerance (SVN) none   Breath Sounds Post-Respiratory Treatment   Throughout All Fields Post-Treatment All Fields   Throughout All Fields Post-Treatment no change;aeration increased   Post-treatment Heart Rate (beats/min) 76   Post-treatment Resp Rate (breaths/min) 20   Education   $ Education Bronchodilator;15 min     Spoke to patient about PEP therapy since he seems to have a lot to cough up but is unable. He said he was not interested and he would just take his breathing txs.  I told him that I would pass it along to day shift to see if was was interested after having the chance to think about the benefits.

## 2025-01-27 NOTE — CARE UPDATE
01/27/25 0653   Patient Assessment/Suction   Level of Consciousness (AVPU) alert   Respiratory Effort Normal;Unlabored   LETICIA Breath Sounds crackles   LLL Breath Sounds crackles   RUL Breath Sounds crackles   RML Breath Sounds crackles   RLL Breath Sounds crackles   PRE-TX-O2   Device (Oxygen Therapy) nasal cannula   $ Is the patient on Low Flow Oxygen? Yes   Flow (L/min) (Oxygen Therapy) 2   Oxygen Concentration (%) 28   SpO2 (!) 92 %   Pulse Oximetry Type Intermittent   $ Pulse Oximetry - Multiple Charge Pulse Oximetry - Multiple   Pulse 90   Resp 20   Positioning HOB elevated 45 degrees   Aerosol Therapy   $ Aerosol Therapy Charges Aerosol Treatment   Daily Review of Necessity (SVN) completed   Respiratory Treatment Status (SVN) given   Treatment Route (SVN) mask;oxygen   Patient Position HOB elevated   Signs of Intolerance (SVN) none   Breath Sounds Post-Respiratory Treatment   Throughout All Fields Post-Treatment All Fields   Throughout All Fields Post-Treatment aeration increased   Post-treatment Heart Rate (beats/min) 89   Post-treatment Resp Rate (breaths/min) 20   Ready to Wean/Extubation Screen   FIO2<=50 (chart decimal) 0.28

## 2025-01-27 NOTE — PLAN OF CARE
Problem: Adult Inpatient Plan of Care  Goal: Plan of Care Review  Outcome: Adequate for Care Transition  Goal: Patient-Specific Goal (Individualized)  Outcome: Adequate for Care Transition  Goal: Absence of Hospital-Acquired Illness or Injury  Outcome: Adequate for Care Transition  Goal: Optimal Comfort and Wellbeing  Outcome: Adequate for Care Transition  Goal: Readiness for Transition of Care  Outcome: Adequate for Care Transition     Problem: COPD (Chronic Obstructive Pulmonary Disease)  Goal: Optimal Chronic Illness Coping  Outcome: Adequate for Care Transition  Goal: Optimal Level of Functional [place]  Outcome: Adequate for Care Transition  Goal: Absence of Infection Signs and Symptoms  Outcome: Adequate for Care Transition  Goal: Improved Oral Intake  Outcome: Adequate for Care Transition  Goal: Effective Oxygenation and Ventilation  Outcome: Adequate for Care Transition     Problem: Pneumonia  Goal: Fluid Balance  Outcome: Adequate for Care Transition  Goal: Resolution of Infection Signs and Symptoms  Outcome: Adequate for Care Transition  Goal: Effective Oxygenation and Ventilation  Outcome: Adequate for Care Transition     Problem: Fatigue  Goal: Improved Activity Tolerance  Outcome: Adequate for Care Transition

## 2025-01-27 NOTE — DISCHARGE INSTRUCTIONS
Our goal at [facility] is to always give you outstanding care and exceptional service. You may receive a survey by mail, text or e-mail in the next 7-10 days from Magalys Kunz and our leadership team asking about the care you received with us. The survey should only take 5-10 minutes to complete and is very important to us.     Your feedback provides us with a way to recognize our staff who work tirelessly to provide the best care! Also, your responses help us learn how to improve when your experience was below our aspiration of excellence. We WILL use your feedback to continue making improvements to help us provide the highest quality care. We keep your personal information and feedback confidential. We appreciate your time completing this survey and can't wait to hear from you!!!     We want you to leave us today feeling like you can DEFINITELY recommend us to others! We look forward to your continued care with us! Thanks so much for choosing [facility] for your healthcare needs!      Complete medications as ordered  Follow all discharge instructions.  Please schedule follow up appointments as necessary      When to Call Your Doctor  Call your doctor immediately if you have any of the following:  Severe headache  Severe dizziness, or fainting  Nausea or vomiting  Fast heartbeat (higher than 100 beats per minute)  Fever or chills  Swollen ankles  Weakness  Chest Pain attacks that last longer, occur more often, or are more severe than in the past

## 2025-01-27 NOTE — CARE UPDATE
01/27/25 0920   Patient Assessment/Suction   Level of Consciousness (AVPU) alert   Respiratory Effort Normal;Unlabored   PRE-TX-O2   Device (Oxygen Therapy) room air   SpO2 (!) 89 %   Pulse Oximetry Type Intermittent   $ Pulse Oximetry - Multiple Charge Pulse Oximetry - Multiple   Pulse 95   Home Oxygen Qualification   $ Home O2 Qualification Pulmonary Stress Test/6 min walk   Room Air SpO2 At Rest (!) 89 %   Room Air SpO2 During Ambulation (!) 87 %   Heart Rate on O2 116 bpm   Ambulation O2 LPM 4 LPM   SpO2 Post Ambulation 93 %   Post Ambulation Heart Rate 99 bpm   Post Ambulation O2 LPM 4 LPM

## 2025-01-27 NOTE — PROGRESS NOTES
Pt verbalized understanding of D/C instructions by virtual nurse. Pt released via W/C with oxygen to wife in stable condition. Adequate for D/C.

## 2025-01-27 NOTE — DISCHARGE SUMMARY
"[facility]  Discharge Summary      Patient Name: Prosper Hua  MRN: [id number]  SURYA: [id number]  Patient Class: OP- Observation  Admission Date: 1/26/2025  Hospital Length of Stay: 0 days  Discharge Date and Time:  01/27/2025 10:52 AM  Attending Physician: Romelia Willett MD   Discharging Provider: ALFONSO Mims  Primary Care Provider: Ervin Batista MD    Primary Care Team: Networked reference to record PCT     HPI:   Prosper Hua is a 69 y.o. male with  has a past medical history of Anxiety, Hyperlipidemia, Hypertension, and MI (myocardial infarction) (01/01/2007). who presented to the ED with a Shortness of Breath (No fever  cxr done today )    Patient present with c/o 5 day progressively worsening shortness of breath with associated activity intolerance, mild cough and post nasal drip. He was found to be [id number] with oxygen sats dipping to 87-88% on 2L oxygen. He recovers to 90% on 3L. Per patient 1 week ago he had sick contact with a coworker who was diagnosed with influenza (unknown which). He endorses that he exposed his wife who in turn tested positive and was treated for flu. Patient reports that he started feeling unwell on Saturday 01/18/25, but states he started to feel better on Sunday and Monday of same week. By Wednesday he began to go "downhill" again. Patient denies fever. He smoked 1 pack of cigarettes daily (quit smoking 10 days ago).    Lactic, procal, influenza A/B, and covid negative. Troponin 1 level and BNP negative. CXR significant for upper left lobe developing pulmonary infiltrate as well as noting underlying COPD. Patient denies ever having a diagnosis of COPD. He has mild Leukocytosis, hyponatremia = 132, and hypokalemia 3.0. BC pending.    * No surgery found *      Hospital Course:   Patient started on COPd pathway including ABX steroids, respiratory treatments, supplemental oxygen, and ABX. ABX should also cover PNA (infiltrate " on CXR) plus tamiflu as well as due to patient's exposure to sick contacts (wife) who tested positive for influenza. He had walk test which shows his oxygen sat decreasing with ambulation and home oxygen was ordered. Patient did well overnight. He is in good spirits in early morning.    He insists on going home. Tells me that he feels fine. He has still have oxygen need and desat during ambulation, however, I suspect some degree of chronicity 2/2 to long smoking history. His CXR did show underlying COPD. Patient was seen and examined and deemed appropriate for discharge. He remained afebrile, without leukocytosis and he recovers well with supplemental oxygen. He converted to po prednisone and po ABX. CiD9s=5.7.He received p.o. potassium supplementation for low potassium level. He is non-ill appearing. Ambulatory referral to pulmonology - case management enlisted to assist with scheduling. He will discharge to home in stable condition with supplemental oxygen, Advair, and medrol dose bjorn. Also complete ABX for cap (PCN allergy [doxy + azithro]) and tamiflu. Renal functions remain stable. Ambulatory referral to pulmonology given his history of smoking, underlying COPD per CXR, and current supplemental oxygen needs. He is stable for discharge. FU with pul and PCP in clinic.        Goals of Care Treatment Preferences:  Code Status: Full Code      SDOH Screening:  The patient declined to be screened for utility difficulties, food insecurity, transport difficulties, housing insecurity, and interpersonal safety, so no concerns could be identified this admission.     Consults:     Final Active Diagnoses:    Diagnosis Date Noted POA    PRINCIPAL PROBLEM:  Left upper lobe pneumonia [J18.9] 01/26/2025 Yes    Acute hypoxemic respiratory failure [J96.01] 01/26/2025 Yes    Hypokalemia [E87.6] 01/26/2025 Yes    COPD exacerbation [J44.1] 01/27/2025 Yes    Smoker [F17.200] 01/26/2025 Yes    Hypertension [I10]  Yes    Anxiety [F41.9]   Yes      Problems Resolved During this Admission:       Discharged Condition: stable    Disposition: Home or Self Care    Follow Up:   Follow-up Information       Lynn Alvarez MD Follow up.    Specialties: Pulmonary Disease, Critical Care Medicine  Why: A referral has been placed.  The office will contact you to schedule an appointment.  Please call them if you have not heard from them within 1 week.  Contact information:  [address]  [address]  [place] [address]  [phone]               Maehler, Jewel A., FNP. Go on 2/3/2025.    Specialty: Family Medicine  Why: Hospital follow up scheduled at 9:00 AM  Contact information:  [address]  [address]  [place] [address]  [phone]                           Patient Instructions:      OXYGEN FOR HOME USE     Order Specific Question Answer Comments   Liter Flow 4    Duration Continuous    Qualifying Test Performed at: Activity    Oxygen saturation at rest 89    Oxygen saturation with activity 87    Oxygen saturation with activity on oxygen 93    Portable mode: continuous    Route nasal cannula    Device: home concentrator with portable tanks    Length of need (in months): 3 mos    Patient condition with qualifying saturation COPD    Height: 6' (1.829 m)    Weight: 79.8 kg (176 lb)    Alternative treatment measures have been tried or considered and deemed clinically ineffective. Yes      NEBULIZER FOR HOME USE     Order Specific Question Answer Comments   Height: 6' (1.829 m)    Weight: 79.8 kg (176 lb)    Does patient have medical equipment at home? none    Length of need (1-99 months): 3      Ambulatory referral/consult to Pulmonology   Standing Status: Future   Referral Priority: Routine Referral Type: Consultation   Referral Reason: Specialty Services Required   Requested Specialty: Pulmonary Disease   Number of Visits Requested: 1     Diet Cardiac     Activity as tolerated       Significant Diagnostic Studies: Labs: CMP   Recent Labs   Lab  01/26/25  1212 01/27/25  0500   * 134*   K 3.0* 3.2*   CL 95 96   CO2 23 25   * 216*   BUN 10 10   CREATININE 0.7 0.7   CALCIUM 8.7 8.3*   PROT 7.5  --    ALBUMIN 3.0*  --    BILITOT 0.8  --    ALKPHOS 103  --    AST 32  --    ALT 36  --    ANIONGAP 14 13   , CBC   Recent Labs   Lab 01/26/25  1212 01/27/25  0500   WBC 12.90* 9.05   HGB 15.7 14.5   HCT 44.9 41.7    276   , Lipid Panel   Lab Results   Component Value Date    CHOL 103 (L) 01/26/2025    HDL 24 (L) 01/26/2025    LDLCALC 53.8 (L) 01/26/2025    TRIG 126 01/26/2025    CHOLHDL 23.3 01/26/2025   , Troponin   Recent Labs   Lab 01/26/25  1212   TROPONINI <0.006   , and A1C:   Recent Labs   Lab 01/26/25  1212   HGBA1C 5.7       Pending Diagnostic Studies:       None           Medications:  Reconciled Home Medications:      Medication List        START taking these medications      albuterol-ipratropium 2.5 mg-0.5 mg/3 mL nebulizer solution  Commonly known as: DUO-NEB  Take 3 mLs by nebulization every 6 (six) hours as needed for Wheezing. Rescue     azithromycin 250 MG tablet  Commonly known as: Z-REJI  Take 1 tablet (250 mg total) by mouth once daily. for 3 days  Start taking on: January 28, 2025  Notes to patient: ANTIBIOTIC     doxycycline 100 MG tablet  Commonly known as: VIBRA-TABS  Take 1 tablet (100 mg total) by mouth every 12 (twelve) hours. for 4 days  Notes to patient: ANTIBIOTIC     fluticasone-salmeterol 100-50 mcg/dose 100-50 mcg/dose diskus inhaler  Commonly known as: ADVAIR  Inhale 1 puff into the lungs 2 (two) times daily. Controller  Notes to patient: COMBO STEROID/BRONCHODILATOR, IMPROVES BREATHING     methylPREDNISolone 4 mg tablet  Commonly known as: MEDROL DOSEPACK  use as directed  Notes to patient: STEROID-REDUCES INFLAMMATION     nicotine 21 mg/24 hr  Commonly known as: NICODERM CQ  Place 1 patch onto the skin once daily.     oseltamivir 75 MG capsule  Commonly known as: TAMIFLU  Take 1 capsule (75 mg total) by mouth 2  (two) times daily. for 5 days  Notes to patient: TREATS FLU SYMPTOMS     potassium chloride SA 10 MEQ tablet  Commonly known as: K-DUR,KLOR-CON M  Take 1 tablet (10 mEq total) by mouth once daily. for 5 days            CHANGE how you take these medications      lisinopriL 20 MG tablet  Commonly known as: PRINIVIL,ZESTRIL  Take 1 tablet (20 mg total) by mouth once daily.  Start taking on: January 28, 2025  What changed:   medication strength  how much to take            CONTINUE taking these medications      atorvastatin 80 MG tablet  Commonly known as: LIPITOR  Take 80 mg by mouth every evening.     clopidogreL 75 mg tablet  Commonly known as: PLAVIX  Take 75 mg by mouth once daily.     metoprolol succinate 100 MG 24 hr tablet  Commonly known as: TOPROL-XL  Take 50 mg by mouth once daily.            ASK your doctor about these medications      ALPRAZolam 0.5 MG tablet  Commonly known as: XANAX  Take 0.5 mg by mouth nightly as needed.              Indwelling Lines/Drains at time of discharge:   Lines/Drains/Airways       None                   Time spent on the discharge of patient: 45 minutes             Cindi Chan, SHAILESH, APRN, FNP-C  [facility]  [facility]  [email or web address]

## 2025-01-27 NOTE — HOSPITAL COURSE
Patient started on COPd pathway including ABX steroids, respiratory treatments, supplemental oxygen, and ABX. ABX should also cover PNA (infiltrate on CXR) plus tamiflu as well as due to patient's exposure to sick contacts (wife) who tested positive for influenza. He had walk test which shows his oxygen sat decreasing with ambulation and home oxygen was ordered. Patient did well overnight. He is in good spirits in early morning.    He insists on going home. Tells me that he feels fine. He has still have oxygen need and desat during ambulation, however, I suspect some degree of chronicity 2/2 to long smoking history. His CXR did show underlying COPD. Patient was seen and examined and deemed appropriate for discharge. He remained afebrile, without leukocytosis and he recovers well with supplemental oxygen. He converted to po prednisone and po ABX. DrT4g=0.7.He received p.o. potassium supplementation for low potassium level. He is non-ill appearing. Ambulatory referral to pulmonology - case management enlisted to assist with scheduling. He will discharge to home in stable condition with supplemental oxygen, Advair, and medrol dose bjorn. Also complete ABX for cap (PCN allergy [doxy + azithro]) and tamiflu. Renal functions remain stable. Ambulatory referral to pulmonology given his history of smoking, underlying COPD per CXR, and current supplemental oxygen needs. He is stable for discharge. FU with pul and PCP in clinic.

## 2025-01-27 NOTE — PROGRESS NOTES
AVS virtually reviewed with Mr and Mrs Hua in its entirety with emphasis on diet, medications, follow-up appointments and reasons to return to the ED or contact the [facility] On Call Nurse Care Line. Patient also encouraged to utilize their patient portal. Ease and convenience of use reiterated. Education complete and patient voiced understanding. All questions answered. Discharge teaching complete.

## 2025-01-27 NOTE — CARE UPDATE
01/27/25 0920   Patient Assessment/Suction   Level of Consciousness (AVPU) alert   Respiratory Effort Normal;Unlabored   PRE-TX-O2   Device (Oxygen Therapy) room air   SpO2 (!) 89 %   Pulse Oximetry Type Intermittent   $ Pulse Oximetry - Multiple Charge Pulse Oximetry - Multiple   Pulse 95   Home Oxygen Qualification   $ Home O2 Qualification Pulmonary Stress Test/6 min walk   Room Air SpO2 At Rest (!) 89 %   SpO2 During Ambulation on O2 (!) 87 %   Heart Rate on O2 116 bpm   Ambulation O2 LPM 4 LPM   SpO2 Post Ambulation 93 %   Post Ambulation Heart Rate 99 bpm   Post Ambulation O2 LPM 4 LPM

## 2025-01-27 NOTE — PLAN OF CARE
R AC SL dc'd. Lynne diet. Voiding well. 2L O2/NC in place--going home with oxygen & neb. Telemetry dc'd. Up ad adela. Received phos replacement. Lynne Norco for generalized pain. D/C instructions given by virtual nurse.

## 2025-01-27 NOTE — NURSING
"Patient called out requesting prn meds for anxiety. This RN presented to pt's bedside with prn dose of Xanax 0.5mg. When asked pt what was causing his anxiety, pt stated "I feel like I can't breathe." This RN noted pt's nasal cannula was laying in the bed. RN placed NC back on pt's face. Pt apparently had gone to use the restroom and forgotten to replace NC. Pt encouraged to use his urinal at the bedside so he doesn't have to remove his NC. Pt verbalized understanding. Pt did take the Xanax to help with his anxiety. Upon reassessment 30 min after taking medication and with continued use of O2 per NC, pt was noted to be calmly resting in bed with no s/s emotional or respiratory distress.  "

## 2025-01-31 ENCOUNTER — PATIENT MESSAGE (OUTPATIENT)
Dept: CASE MANAGEMENT | Facility: HOSPITAL | Age: 70
End: 2025-01-31

## 2025-01-31 LAB
BACTERIA BLD CULT: NORMAL
BACTERIA BLD CULT: NORMAL

## 2025-02-03 ENCOUNTER — OFFICE VISIT (OUTPATIENT)
Facility: CLINIC | Age: 70
End: 2025-02-03
Payer: COMMERCIAL

## 2025-02-03 ENCOUNTER — LAB VISIT (OUTPATIENT)
Dept: LAB | Facility: HOSPITAL | Age: 70
End: 2025-02-03
Payer: COMMERCIAL

## 2025-02-03 VITALS
DIASTOLIC BLOOD PRESSURE: 83 MMHG | HEIGHT: 72 IN | BODY MASS INDEX: 22.94 KG/M2 | TEMPERATURE: 98 F | WEIGHT: 169.38 LBS | HEART RATE: 84 BPM | SYSTOLIC BLOOD PRESSURE: 136 MMHG

## 2025-02-03 DIAGNOSIS — F41.9 ANXIETY: ICD-10-CM

## 2025-02-03 DIAGNOSIS — E87.6 HYPOKALEMIA: ICD-10-CM

## 2025-02-03 DIAGNOSIS — J44.1 COPD EXACERBATION: ICD-10-CM

## 2025-02-03 DIAGNOSIS — F17.200 SMOKER: ICD-10-CM

## 2025-02-03 DIAGNOSIS — I10 HYPERTENSION, UNSPECIFIED TYPE: ICD-10-CM

## 2025-02-03 DIAGNOSIS — J18.9 PNEUMONIA OF LEFT UPPER LOBE DUE TO INFECTIOUS ORGANISM: Primary | ICD-10-CM

## 2025-02-03 LAB
ANION GAP SERPL CALC-SCNC: 9 MMOL/L (ref 8–16)
BUN SERPL-MCNC: 14 MG/DL (ref 8–23)
CALCIUM SERPL-MCNC: 9.6 MG/DL (ref 8.7–10.5)
CHLORIDE SERPL-SCNC: 98 MMOL/L (ref 95–110)
CO2 SERPL-SCNC: 28 MMOL/L (ref 23–29)
CREAT SERPL-MCNC: 0.8 MG/DL (ref 0.5–1.4)
EST. GFR  (NO RACE VARIABLE): >60 ML/MIN/1.73 M^2
GLUCOSE SERPL-MCNC: 100 MG/DL (ref 70–110)
POTASSIUM SERPL-SCNC: 4.9 MMOL/L (ref 3.5–5.1)
SODIUM SERPL-SCNC: 135 MMOL/L (ref 136–145)

## 2025-02-03 PROCEDURE — 80048 BASIC METABOLIC PNL TOTAL CA: CPT

## 2025-02-03 PROCEDURE — 4010F ACE/ARB THERAPY RXD/TAKEN: CPT | Mod: CPTII,S$GLB,,

## 2025-02-03 PROCEDURE — 1159F MED LIST DOCD IN RCRD: CPT | Mod: CPTII,S$GLB,,

## 2025-02-03 PROCEDURE — 1126F AMNT PAIN NOTED NONE PRSNT: CPT | Mod: CPTII,S$GLB,,

## 2025-02-03 PROCEDURE — 3044F HG A1C LEVEL LT 7.0%: CPT | Mod: CPTII,S$GLB,,

## 2025-02-03 PROCEDURE — 36415 COLL VENOUS BLD VENIPUNCTURE: CPT

## 2025-02-03 PROCEDURE — 99999 PR PBB SHADOW E&M-EST. PATIENT-LVL III: CPT | Mod: PBBFAC,,,

## 2025-02-03 PROCEDURE — 3079F DIAST BP 80-89 MM HG: CPT | Mod: CPTII,S$GLB,,

## 2025-02-03 PROCEDURE — 3075F SYST BP GE 130 - 139MM HG: CPT | Mod: CPTII,S$GLB,,

## 2025-02-03 PROCEDURE — 99214 OFFICE O/P EST MOD 30 MIN: CPT | Mod: S$GLB,,,

## 2025-02-03 RX ORDER — BUSPIRONE HYDROCHLORIDE 5 MG/1
5 TABLET ORAL 3 TIMES DAILY PRN
Qty: 60 TABLET | Refills: 2 | Status: SHIPPED | OUTPATIENT
Start: 2025-02-03 | End: 2025-02-03

## 2025-02-03 RX ORDER — BUSPIRONE HYDROCHLORIDE 5 MG/1
5 TABLET ORAL 2 TIMES DAILY PRN
Qty: 60 TABLET | Refills: 2 | Status: SHIPPED | OUTPATIENT
Start: 2025-02-03 | End: 2026-02-03

## 2025-02-03 RX ORDER — SERTRALINE HYDROCHLORIDE 25 MG/1
25 TABLET, FILM COATED ORAL DAILY
Qty: 30 TABLET | Refills: 2 | Status: SHIPPED | OUTPATIENT
Start: 2025-02-03 | End: 2025-05-04

## 2025-02-03 RX ORDER — ALBUTEROL SULFATE 90 UG/1
2 INHALANT RESPIRATORY (INHALATION) EVERY 6 HOURS PRN
Qty: 18 G | Refills: 2 | Status: SHIPPED | OUTPATIENT
Start: 2025-02-03

## 2025-02-03 NOTE — ASSESSMENT & PLAN NOTE
Underlying COPD noted on Chest X-ray. Patient not formally diagnosed with COPD.   Currently on O2 at 4 liters. Instructed to monitor O2 saturations closely and wean as tolerated.  Continue Advair controller therapy twice daily.  Albuterol inhaler as needed, Rx sent.  Importance of Smoking cessation discussed.  Follow-up with Pulmonology, referral placed and contact information given.

## 2025-02-03 NOTE — PROGRESS NOTES
Subjective:  Chief Complaint   Patient presents with    Hospital Follow Up       History of Present Illness    Transitional Care Note    Family and/or Caretaker present at visit?  Yes.  Diagnostic tests reviewed/disposition: No diagnosic tests pending after this hospitalization.  Disease/illness education: Smoking cessation. Anxiety.   Home health/community services discussion/referrals: Patient does not have home health established from hospital visit.  They do not need home health.  If needed, we will set up home health for the patient.   Establishment or re-establishment of referral orders for community resources: No other necessary community resources.   Discussion with other health care providers: No discussion with other health care providers necessary.     This 69 y.o. presents today for complaint of hospital follow-up for PNA. Hospital course reviewed.  He reports he is feeling better since hospital discharge but still having SOB on exertion. He reports he has intermittent productive cough of white/clear sputum. He is currently on O2 at 4 Liters at apt and O2 saturation 94% here at clinic. He reports he does not wear it all the time, mostly wears it with activity and when outside of the house. He reports he has pulse ox he has been using to check his O2 sats at home. He denies any fever, chills, diaphoresis, CP, palpitations, N/V. He does report that he has had anxiety concerning recent onset of shortness of breath and requests something to help with this. He reports that he does not take anything currently for anxiety.   He reports he has finished Azithromycin and Doxycycline abx courses. He has also finished Tamiflu course and medrol dose pack. He is currently using Advair inhaler daily, reports he did not realize it was to be used twice daily. He reports he has been using albuterol nebulizer treatment about once or twice daily. He reports he is smoking about 3-4 cigarettes daily which is a big reduction  in what he used to smoke daily. Discussed underlying COPD as demonstrated by Chest Xray in hospital. Patient reports he does not have apt scheduled with Pulmonology yet.   Discussed low potassium level in hospital, patient reports he was discharged on a few days of oral potassium supplement that he has now finished.  He reports compliance with BP medications, no issues concerning BP reported.  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        Review of Systems   Constitutional:  Negative for chills, diaphoresis and fever.   Respiratory:  Positive for shortness of breath. Negative for cough and wheezing.         Shortness of breath on exertion  Cough clear-white   Cardiovascular:  Negative for chest pain and palpitations.   Gastrointestinal:  Negative for constipation, diarrhea, nausea and vomiting.       (D/C Summary)  Admission Date: 1/26/2025  Hospital Length of Stay: 0 days  Discharge Date and Time:  01/27/2025 10:52 AM  Attending Physician: Romelia Willett MD   Discharging Provider: ALFONSO Mims  Primary Care Provider: Ervin Batista MD     Primary Care Team: Networked reference to record PCT      HPI:   Prosper Hua is a 69 y.o. male with  has a past medical history of Anxiety, Hyperlipidemia, Hypertension, and MI (myocardial infarction) (01/01/2007). who presented to the ED with a Shortness of Breath (No fever  cxr done today )     Patient present with c/o 5 day progressively worsening shortness of breath with associated activity intolerance, mild cough and post nasal drip. He was found to be [id number] with oxygen sats dipping to 87-88% on 2L oxygen. He recovers to 90% on 3L. Per patient 1 week ago he had sick contact with a coworker who was diagnosed with influenza (unknown which). He endorses that he exposed his wife who in turn tested positive and was treated for flu. Patient reports that he started feeling unwell on  "Saturday 01/18/25, but states he started to feel better on Sunday and Monday of same week. By Wednesday he began to go "downhill" again. Patient denies fever. He smoked 1 pack of cigarettes daily (quit smoking 10 days ago).     Lactic, procal, influenza A/B, and covid negative. Troponin 1 level and BNP negative. CXR significant for upper left lobe developing pulmonary infiltrate as well as noting underlying COPD. Patient denies ever having a diagnosis of COPD. He has mild Leukocytosis, hyponatremia = 132, and hypokalemia 3.0. BC pending.     * No surgery found *       Hospital Course:   Patient started on COPd pathway including ABX steroids, respiratory treatments, supplemental oxygen, and ABX. ABX should also cover PNA (infiltrate on CXR) plus tamiflu as well as due to patient's exposure to sick contacts (wife) who tested positive for influenza. He had walk test which shows his oxygen sat decreasing with ambulation and home oxygen was ordered. Patient did well overnight. He is in good spirits in early morning.     He insists on going home. Tells me that he feels fine. He has still have oxygen need and desat during ambulation, however, I suspect some degree of chronicity 2/2 to long smoking history. His CXR did show underlying COPD. Patient was seen and examined and deemed appropriate for discharge. He remained afebrile, without leukocytosis and he recovers well with supplemental oxygen. He converted to po prednisone and po ABX. QvU0c=4.7.He received p.o. potassium supplementation for low potassium level. He is non-ill appearing. Ambulatory referral to pulmonology - case management enlisted to assist with scheduling. He will discharge to home in stable condition with supplemental oxygen, Advair, and medrol dose bjorn. Also complete ABX for cap (PCN allergy [doxy + azithro]) and tamiflu. Renal functions remain stable. Ambulatory referral to pulmonology given his history of smoking, underlying COPD per CXR, and current " supplemental oxygen needs. He is stable for discharge. FU with pul and PCP in clinic.        Objective:    /83   Pulse 84   Temp 97.9 °F (36.6 °C) (Oral)   Ht 6' (1.829 m)   Wt 76.8 kg (169 lb 6.4 oz)   BMI 22.97 kg/m²  Body mass index is 22.97 kg/m².    BP Readings from Last 3 Encounters:   02/03/25 136/83   01/27/25 136/74   01/26/25 (!) 152/87       Wt Readings from Last 3 Encounters:   02/03/25 76.8 kg (169 lb 6.4 oz)   01/26/25 79.8 kg (176 lb)   01/26/25 77.1 kg (170 lb)       Physical Exam  Constitutional:       General: He is not in acute distress.  Cardiovascular:      Rate and Rhythm: Normal rate and regular rhythm.      Pulses: Normal pulses.   Pulmonary:      Effort: No respiratory distress.      Breath sounds: Wheezing present.      Comments: Mild expiratory wheezing throughout lung fields  Currently on O2 at 4 liters via nasal cannula  Skin:     General: Skin is warm and dry.   Neurological:      Mental Status: He is alert and oriented to person, place, and time.   Psychiatric:         Mood and Affect: Mood normal.         Behavior: Behavior normal.         Thought Content: Thought content normal.         Judgment: Judgment normal.       Assessment/Plan:   1. Pneumonia of left upper lobe due to infectious organism  Assessment & Plan:  Improved.  Antibiotic courses, Medrol dose pack and Tamiflu (treated due to wife testing positive) course completed.  Albuterol inhaler as needed.  Follow-up with Pulmonology.    Orders:  -     albuterol (VENTOLIN HFA) 90 mcg/actuation inhaler; Inhale 2 puffs into the lungs every 6 (six) hours as needed for Wheezing or Shortness of Breath. Rescue  Dispense: 18 g; Refill: 2    2. COPD exacerbation  Assessment & Plan:  Underlying COPD noted on Chest X-ray. Patient not formally diagnosed with COPD.   Currently on O2 at 4 liters. Instructed to monitor O2 saturations closely and wean oxygen as tolerated. Ensure to keep O2 sats above 90%.  Continue Advair controller  therapy twice daily.  Albuterol inhaler as needed, Rx sent.  Importance of Smoking cessation discussed.  Follow-up with Pulmonology, referral placed and contact information given.  Discussed ER precautions.    Orders:  -     Ambulatory referral/consult to Pulmonology; Future; Expected date: 02/10/2025  -     albuterol (VENTOLIN HFA) 90 mcg/actuation inhaler; Inhale 2 puffs into the lungs every 6 (six) hours as needed for Wheezing or Shortness of Breath. Rescue  Dispense: 18 g; Refill: 2    3. Smoker  Assessment & Plan:  Currently smoking 3-4 cigarettes daily.  Importance of smoking cessation discussed.  Smoking cessation program/aids offered, patient declines at this time.       4. Hypertension, unspecified type  Assessment & Plan:  Stable and controlled per vitals in clinic  Continue current prescription therapy.  Cardiac diet- low salt/low sodium  Follow-up with PCP.      5. Anxiety  Assessment & Plan:  Start Sertraline 25mg daily, SERBs of medication discussed.  Patient instructed that it may take 4-6 weeks for full effects of medications.   Buspar as needed for anxiety. SERBs of medication discussed.  Encouraged use of healthy coping mechanisms.   Follow-up with PCP.    Orders:  -     sertraline (ZOLOFT) 25 MG tablet; Take 1 tablet (25 mg total) by mouth once daily.  Dispense: 30 tablet; Refill: 2  -     busPIRone (BUSPAR) 5 MG Tab; Take 1 tablet (5 mg total) by mouth 2 (two) times daily as needed (Anxiety).  Dispense: 60 tablet; Refill: 2    6. Hypokalemia  Assessment & Plan:  Low per recent labs.  Recheck BMP today, I will review and address results accordingly.       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    Orders Placed This Encounter    Basic Metabolic Panel    Ambulatory referral/consult to Pulmonology    sertraline (ZOLOFT) 25 MG tablet    albuterol (VENTOLIN HFA) 90 mcg/actuation inhaler    busPIRone (BUSPAR) 5 MG Tab      Need to follow-up with Pulmonology. Referral placed. Contact information given to patient.    PCP apt scheduled for 3/5/25.     Follow up if symptoms worsen or fail to improve.

## 2025-02-03 NOTE — PATIENT INSTRUCTIONS
Lynn Alvarez MD Follow up.    Specialties: Pulmonary Disease, Critical Care Medicine  Why: A referral has been placed.  The office will contact you to schedule an appointment.  Please call them if you have not heard from them within 1 week.  Contact information:  [address] [facility]  [address] [place] [id number]  [phone]

## 2025-02-03 NOTE — ASSESSMENT & PLAN NOTE
Improved.  Antibiotic courses, Medrol dose pack and Tamiflu (treated due to wife testing positive) course completed.  Albuterol inhaler as needed.  Follow-up with Pulmonology.

## 2025-02-03 NOTE — ASSESSMENT & PLAN NOTE
Currently smoking 3-4 cigarettes daily.  Importance of smoking cessation discussed.  Smoking cessation program/aids offered, patient declines at this time.

## 2025-02-03 NOTE — ASSESSMENT & PLAN NOTE
Start Sertraline 25mg daily, SERBs of medication discussed.  Patient instructed that it may take 4-6 weeks for full effects of medications.   Buspar as needed for anxiety. SERBs of medication discussed.  Encouraged use of healthy coping mechanisms.   Follow-up with PCP.

## 2025-02-03 NOTE — ASSESSMENT & PLAN NOTE
Stable and controlled per vitals in clinic  Continue current prescription therapy.  Cardiac diet- low salt/low sodium  Follow-up with PCP.

## 2025-02-03 NOTE — LETTER
February 3, 2025      [facility] [address]  [address]  [address]  [facility] [address]  Phone: [phone]  Fax: [phone]       Patient: Prosper Hua   YOB: 1955  Date of Visit: 02/03/2025    To Whom It May Concern:    Tamika Hua  was at [facility] on 02/03/2025. The patient may return to work/school on 2/7/25 with no restrictions. If you have any questions or concerns, or if I can be of further assistance, please do not hesitate to contact me.    Sincerely,    ALFONSO Schultz

## 2025-08-15 ENCOUNTER — PATIENT OUTREACH (OUTPATIENT)
Dept: ADMINISTRATIVE | Facility: HOSPITAL | Age: 70
End: 2025-08-15
Payer: COMMERCIAL

## 2025-08-15 ENCOUNTER — PATIENT MESSAGE (OUTPATIENT)
Dept: ADMINISTRATIVE | Facility: HOSPITAL | Age: 70
End: 2025-08-15
Payer: COMMERCIAL